# Patient Record
Sex: MALE | Race: BLACK OR AFRICAN AMERICAN | Employment: UNEMPLOYED | URBAN - METROPOLITAN AREA
[De-identification: names, ages, dates, MRNs, and addresses within clinical notes are randomized per-mention and may not be internally consistent; named-entity substitution may affect disease eponyms.]

---

## 2017-09-14 ENCOUNTER — HOSPITAL ENCOUNTER (EMERGENCY)
Age: 27
Discharge: HOME | End: 2017-09-14
Attending: EMERGENCY MEDICINE
Payer: MEDICAID

## 2017-09-14 VITALS
BODY MASS INDEX: 26.5 KG/M2 | HEART RATE: 54 BPM | OXYGEN SATURATION: 99 % | SYSTOLIC BLOOD PRESSURE: 132 MMHG | WEIGHT: 190 LBS | DIASTOLIC BLOOD PRESSURE: 84 MMHG | TEMPERATURE: 97.2 F | RESPIRATION RATE: 16 BRPM

## 2017-09-14 DIAGNOSIS — M79.18 BRACHIORADIALIS MUSCLE TENDERNESS: Primary | ICD-10-CM

## 2017-09-14 PROCEDURE — 6370000000 HC RX 637 (ALT 250 FOR IP): Performed by: EMERGENCY MEDICINE

## 2017-09-14 PROCEDURE — 99283 EMERGENCY DEPT VISIT LOW MDM: CPT

## 2017-09-14 RX ORDER — IBUPROFEN 800 MG/1
800 TABLET ORAL ONCE
Status: COMPLETED | OUTPATIENT
Start: 2017-09-14 | End: 2017-09-14

## 2017-09-14 RX ORDER — IBUPROFEN 800 MG/1
800 TABLET ORAL EVERY 8 HOURS PRN
Qty: 30 TABLET | Refills: 0 | Status: SHIPPED | OUTPATIENT
Start: 2017-09-14 | End: 2018-05-17

## 2017-09-14 RX ADMIN — IBUPROFEN 800 MG: 800 TABLET, FILM COATED ORAL at 08:06

## 2017-09-14 ASSESSMENT — PAIN SCALES - GENERAL: PAINLEVEL_OUTOF10: 7

## 2017-09-14 ASSESSMENT — PAIN DESCRIPTION - PAIN TYPE: TYPE: ACUTE PAIN

## 2017-09-14 ASSESSMENT — PAIN DESCRIPTION - LOCATION: LOCATION: ARM

## 2017-09-14 ASSESSMENT — PAIN DESCRIPTION - ORIENTATION: ORIENTATION: LEFT

## 2017-09-14 ASSESSMENT — PAIN DESCRIPTION - ONSET: ONSET: GRADUAL

## 2017-09-14 ASSESSMENT — PAIN DESCRIPTION - PROGRESSION: CLINICAL_PROGRESSION: GRADUALLY WORSENING

## 2017-09-14 ASSESSMENT — PAIN DESCRIPTION - FREQUENCY: FREQUENCY: CONTINUOUS

## 2017-09-15 ASSESSMENT — ENCOUNTER SYMPTOMS
BACK PAIN: 0
COLOR CHANGE: 0

## 2018-05-17 ENCOUNTER — HOSPITAL ENCOUNTER (EMERGENCY)
Age: 28
Discharge: HOME | End: 2018-05-17
Attending: EMERGENCY MEDICINE
Payer: MEDICAID

## 2018-05-17 VITALS
TEMPERATURE: 99 F | RESPIRATION RATE: 16 BRPM | SYSTOLIC BLOOD PRESSURE: 114 MMHG | DIASTOLIC BLOOD PRESSURE: 79 MMHG | HEART RATE: 71 BPM | OXYGEN SATURATION: 98 %

## 2018-05-17 DIAGNOSIS — K08.89 PAIN, DENTAL: Primary | ICD-10-CM

## 2018-05-17 PROCEDURE — 6370000000 HC RX 637 (ALT 250 FOR IP): Performed by: PHYSICIAN ASSISTANT

## 2018-05-17 PROCEDURE — 99282 EMERGENCY DEPT VISIT SF MDM: CPT

## 2018-05-17 RX ORDER — PENICILLIN V POTASSIUM 250 MG/1
500 TABLET ORAL ONCE
Status: COMPLETED | OUTPATIENT
Start: 2018-05-17 | End: 2018-05-17

## 2018-05-17 RX ORDER — PENICILLIN V POTASSIUM 500 MG/1
500 TABLET ORAL 4 TIMES DAILY
Qty: 40 TABLET | Refills: 0 | Status: ON HOLD | OUTPATIENT
Start: 2018-05-17 | End: 2021-02-14 | Stop reason: HOSPADM

## 2018-05-17 RX ORDER — IBUPROFEN 800 MG/1
800 TABLET ORAL EVERY 8 HOURS PRN
Qty: 30 TABLET | Refills: 0 | Status: ON HOLD | OUTPATIENT
Start: 2018-05-17 | End: 2021-02-14 | Stop reason: HOSPADM

## 2018-05-17 RX ORDER — ACETAMINOPHEN 325 MG/1
650 TABLET ORAL ONCE
Status: COMPLETED | OUTPATIENT
Start: 2018-05-17 | End: 2018-05-17

## 2018-05-17 RX ORDER — ACETAMINOPHEN 325 MG/1
650 TABLET ORAL EVERY 6 HOURS PRN
Qty: 40 TABLET | Refills: 0 | Status: ON HOLD | OUTPATIENT
Start: 2018-05-17 | End: 2021-02-14 | Stop reason: HOSPADM

## 2018-05-17 RX ADMIN — ACETAMINOPHEN 650 MG: 325 TABLET ORAL at 18:38

## 2018-05-17 RX ADMIN — PENICILLIN V POTASSIUM 500 MG: 250 TABLET ORAL at 18:38

## 2018-05-17 RX ADMIN — BENZOCAINE 1 EACH: 220 GEL, DENTIFRICE DENTAL at 18:38

## 2018-05-17 ASSESSMENT — PAIN SCALES - GENERAL
PAINLEVEL_OUTOF10: 8
PAINLEVEL_OUTOF10: 8

## 2018-05-17 ASSESSMENT — PAIN DESCRIPTION - ORIENTATION: ORIENTATION: LEFT;LOWER

## 2018-05-17 ASSESSMENT — PAIN DESCRIPTION - FREQUENCY: FREQUENCY: CONTINUOUS

## 2018-05-17 ASSESSMENT — ENCOUNTER SYMPTOMS
ALLERGIC/IMMUNOLOGIC NEGATIVE: 1
RESPIRATORY NEGATIVE: 1
GASTROINTESTINAL NEGATIVE: 1

## 2018-05-17 ASSESSMENT — PAIN DESCRIPTION - PAIN TYPE: TYPE: ACUTE PAIN

## 2018-05-17 ASSESSMENT — PAIN DESCRIPTION - LOCATION: LOCATION: TEETH

## 2018-05-17 ASSESSMENT — PAIN DESCRIPTION - DESCRIPTORS: DESCRIPTORS: THROBBING

## 2021-02-13 ENCOUNTER — APPOINTMENT (OUTPATIENT)
Dept: GENERAL RADIOLOGY | Age: 31
DRG: 159 | End: 2021-02-13

## 2021-02-13 ENCOUNTER — APPOINTMENT (OUTPATIENT)
Dept: CT IMAGING | Age: 31
DRG: 159 | End: 2021-02-13

## 2021-02-13 ENCOUNTER — ANESTHESIA (OUTPATIENT)
Dept: OPERATING ROOM | Age: 31
DRG: 159 | End: 2021-02-13

## 2021-02-13 ENCOUNTER — ANESTHESIA EVENT (OUTPATIENT)
Dept: OPERATING ROOM | Age: 31
DRG: 159 | End: 2021-02-13

## 2021-02-13 ENCOUNTER — HOSPITAL ENCOUNTER (INPATIENT)
Age: 31
LOS: 1 days | Discharge: HOME | DRG: 159 | End: 2021-02-14
Attending: EMERGENCY MEDICINE | Admitting: SURGERY

## 2021-02-13 VITALS — SYSTOLIC BLOOD PRESSURE: 130 MMHG | DIASTOLIC BLOOD PRESSURE: 67 MMHG | TEMPERATURE: 96.8 F | OXYGEN SATURATION: 100 %

## 2021-02-13 DIAGNOSIS — S02.609A BILATERAL CLOSED FRACTURE OF MANDIBLE, INITIAL ENCOUNTER (HCC): ICD-10-CM

## 2021-02-13 DIAGNOSIS — S02.640A CLOSED FRACTURE OF RAMUS OF MANDIBLE, UNSPECIFIED LATERALITY, INITIAL ENCOUNTER (HCC): Primary | ICD-10-CM

## 2021-02-13 PROBLEM — S01.511A LACERATION OF LIP, INITIAL ENCOUNTER: Status: ACTIVE | Noted: 2021-02-13

## 2021-02-13 PROBLEM — F10.929 ALCOHOL INTOXICATION (HCC): Status: ACTIVE | Noted: 2021-02-13
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
## 2021-02-13 LAB
ABSOLUTE EOS #: 0.13 K/UL (ref 0–0.44)
ABSOLUTE IMMATURE GRANULOCYTE: 0.04 K/UL (ref 0–0.3)
ABSOLUTE LYMPH #: 2.44 K/UL (ref 1.1–3.7)
ABSOLUTE MONO #: 0.75 K/UL (ref 0.1–1.2)
ANION GAP SERPL CALCULATED.3IONS-SCNC: 14 MMOL/L (ref 9–17)
BASOPHILS # BLD: 0 % (ref 0–2)
BASOPHILS ABSOLUTE: 0.06 K/UL (ref 0–0.2)
BUN BLDV-MCNC: 13 MG/DL (ref 6–20)
BUN/CREAT BLD: ABNORMAL (ref 9–20)
CALCIUM SERPL-MCNC: 9.2 MG/DL (ref 8.6–10.4)
CHLORIDE BLD-SCNC: 101 MMOL/L (ref 98–107)
CO2: 21 MMOL/L (ref 20–31)
CREAT SERPL-MCNC: 0.9 MG/DL (ref 0.7–1.2)
DIFFERENTIAL TYPE: ABNORMAL
EOSINOPHILS RELATIVE PERCENT: 1 % (ref 1–4)
GFR AFRICAN AMERICAN: >60 ML/MIN
GFR NON-AFRICAN AMERICAN: >60 ML/MIN
GFR SERPL CREATININE-BSD FRML MDRD: ABNORMAL ML/MIN/{1.73_M2}
GFR SERPL CREATININE-BSD FRML MDRD: ABNORMAL ML/MIN/{1.73_M2}
GLUCOSE BLD-MCNC: 93 MG/DL (ref 70–99)
HCT VFR BLD CALC: 47 % (ref 40.7–50.3)
HEMOGLOBIN: 15.6 G/DL (ref 13–17)
IMMATURE GRANULOCYTES: 0 %
LYMPHOCYTES # BLD: 16 % (ref 24–43)
MCH RBC QN AUTO: 30.1 PG (ref 25.2–33.5)
MCHC RBC AUTO-ENTMCNC: 33.2 G/DL (ref 28.4–34.8)
MCV RBC AUTO: 90.7 FL (ref 82.6–102.9)
MONOCYTES # BLD: 5 % (ref 3–12)
NRBC AUTOMATED: 0 PER 100 WBC
PDW BLD-RTO: 12.4 % (ref 11.8–14.4)
PLATELET # BLD: 228 K/UL (ref 138–453)
PLATELET ESTIMATE: ABNORMAL
PMV BLD AUTO: 9.9 FL (ref 8.1–13.5)
POTASSIUM SERPL-SCNC: 3.6 MMOL/L (ref 3.7–5.3)
RBC # BLD: 5.18 M/UL (ref 4.21–5.77)
RBC # BLD: ABNORMAL 10*6/UL
SARS-COV-2, RAPID: NOT DETECTED
SARS-COV-2: NORMAL
SARS-COV-2: NORMAL
SEG NEUTROPHILS: 78 % (ref 36–65)
SEGMENTED NEUTROPHILS ABSOLUTE COUNT: 11.87 K/UL (ref 1.5–8.1)
SODIUM BLD-SCNC: 136 MMOL/L (ref 135–144)
SOURCE: NORMAL
WBC # BLD: 15.3 K/UL (ref 3.5–11.3)
WBC # BLD: ABNORMAL 10*3/UL

## 2021-02-13 PROCEDURE — 70450 CT HEAD/BRAIN W/O DYE: CPT

## 2021-02-13 PROCEDURE — 6360000002 HC RX W HCPCS: Performed by: NURSE ANESTHETIST, CERTIFIED REGISTERED

## 2021-02-13 PROCEDURE — 6370000000 HC RX 637 (ALT 250 FOR IP): Performed by: PLASTIC SURGERY

## 2021-02-13 PROCEDURE — U0002 COVID-19 LAB TEST NON-CDC: HCPCS

## 2021-02-13 PROCEDURE — 6360000002 HC RX W HCPCS: Performed by: STUDENT IN AN ORGANIZED HEALTH CARE EDUCATION/TRAINING PROGRAM

## 2021-02-13 PROCEDURE — 70486 CT MAXILLOFACIAL W/O DYE: CPT

## 2021-02-13 PROCEDURE — 3600000002 HC SURGERY LEVEL 2 BASE: Performed by: PLASTIC SURGERY

## 2021-02-13 PROCEDURE — 80048 BASIC METABOLIC PNL TOTAL CA: CPT

## 2021-02-13 PROCEDURE — 94760 N-INVAS EAR/PLS OXIMETRY 1: CPT

## 2021-02-13 PROCEDURE — 6370000000 HC RX 637 (ALT 250 FOR IP): Performed by: STUDENT IN AN ORGANIZED HEALTH CARE EDUCATION/TRAINING PROGRAM

## 2021-02-13 PROCEDURE — 0CQ1XZZ REPAIR LOWER LIP, EXTERNAL APPROACH: ICD-10-PCS | Performed by: EMERGENCY MEDICINE

## 2021-02-13 PROCEDURE — 0CQ0XZZ REPAIR UPPER LIP, EXTERNAL APPROACH: ICD-10-PCS | Performed by: PLASTIC SURGERY

## 2021-02-13 PROCEDURE — 90715 TDAP VACCINE 7 YRS/> IM: CPT | Performed by: STUDENT IN AN ORGANIZED HEALTH CARE EDUCATION/TRAINING PROGRAM

## 2021-02-13 PROCEDURE — 12011 RPR F/E/E/N/L/M 2.5 CM/<: CPT

## 2021-02-13 PROCEDURE — 99285 EMERGENCY DEPT VISIT HI MDM: CPT

## 2021-02-13 PROCEDURE — 71045 X-RAY EXAM CHEST 1 VIEW: CPT

## 2021-02-13 PROCEDURE — 90471 IMMUNIZATION ADMIN: CPT | Performed by: STUDENT IN AN ORGANIZED HEALTH CARE EDUCATION/TRAINING PROGRAM

## 2021-02-13 PROCEDURE — 96372 THER/PROPH/DIAG INJ SC/IM: CPT

## 2021-02-13 PROCEDURE — 3600000012 HC SURGERY LEVEL 2 ADDTL 15MIN: Performed by: PLASTIC SURGERY

## 2021-02-13 PROCEDURE — 2W31X9Z IMMOBILIZATION OF FACE USING WIRE: ICD-10-PCS | Performed by: PLASTIC SURGERY

## 2021-02-13 PROCEDURE — 6360000002 HC RX W HCPCS

## 2021-02-13 PROCEDURE — 2500000003 HC RX 250 WO HCPCS: Performed by: NURSE ANESTHETIST, CERTIFIED REGISTERED

## 2021-02-13 PROCEDURE — 2580000003 HC RX 258: Performed by: STUDENT IN AN ORGANIZED HEALTH CARE EDUCATION/TRAINING PROGRAM

## 2021-02-13 PROCEDURE — 72125 CT NECK SPINE W/O DYE: CPT

## 2021-02-13 PROCEDURE — 1200000000 HC SEMI PRIVATE

## 2021-02-13 PROCEDURE — 3700000001 HC ADD 15 MINUTES (ANESTHESIA): Performed by: PLASTIC SURGERY

## 2021-02-13 PROCEDURE — 2709999900 HC NON-CHARGEABLE SUPPLY: Performed by: PLASTIC SURGERY

## 2021-02-13 PROCEDURE — 2580000003 HC RX 258: Performed by: PLASTIC SURGERY

## 2021-02-13 PROCEDURE — 7100000000 HC PACU RECOVERY - FIRST 15 MIN: Performed by: PLASTIC SURGERY

## 2021-02-13 PROCEDURE — 7100000001 HC PACU RECOVERY - ADDTL 15 MIN: Performed by: PLASTIC SURGERY

## 2021-02-13 PROCEDURE — 85025 COMPLETE CBC W/AUTO DIFF WBC: CPT

## 2021-02-13 PROCEDURE — 3700000000 HC ANESTHESIA ATTENDED CARE: Performed by: PLASTIC SURGERY

## 2021-02-13 PROCEDURE — 70498 CT ANGIOGRAPHY NECK: CPT

## 2021-02-13 PROCEDURE — 6360000004 HC RX CONTRAST MEDICATION: Performed by: SURGERY

## 2021-02-13 PROCEDURE — 6360000002 HC RX W HCPCS: Performed by: PLASTIC SURGERY

## 2021-02-13 PROCEDURE — 76376 3D RENDER W/INTRP POSTPROCES: CPT

## 2021-02-13 DEVICE — IMPLANTABLE DEVICE
Type: IMPLANTABLE DEVICE | Site: MANDIBLE | Status: NON-FUNCTIONAL
Removed: 2021-04-07

## 2021-02-13 RX ORDER — AMOXICILLIN AND CLAVULANATE POTASSIUM 600; 42.9 MG/5ML; MG/5ML
600 POWDER, FOR SUSPENSION ORAL EVERY 12 HOURS SCHEDULED
Status: DISCONTINUED | OUTPATIENT
Start: 2021-02-13 | End: 2021-02-14 | Stop reason: HOSPADM

## 2021-02-13 RX ORDER — PROPOFOL 10 MG/ML
INJECTION, EMULSION INTRAVENOUS PRN
Status: DISCONTINUED | OUTPATIENT
Start: 2021-02-13 | End: 2021-02-13 | Stop reason: SDUPTHER

## 2021-02-13 RX ORDER — SODIUM CHLORIDE, SODIUM LACTATE, POTASSIUM CHLORIDE, CALCIUM CHLORIDE 600; 310; 30; 20 MG/100ML; MG/100ML; MG/100ML; MG/100ML
INJECTION, SOLUTION INTRAVENOUS CONTINUOUS
Status: DISCONTINUED | OUTPATIENT
Start: 2021-02-13 | End: 2021-02-14

## 2021-02-13 RX ORDER — SODIUM CHLORIDE 0.9 % (FLUSH) 0.9 %
10 SYRINGE (ML) INJECTION EVERY 12 HOURS SCHEDULED
Status: DISCONTINUED | OUTPATIENT
Start: 2021-02-13 | End: 2021-02-14 | Stop reason: HOSPADM

## 2021-02-13 RX ORDER — CHLORHEXIDINE GLUCONATE 0.12 MG/ML
RINSE ORAL PRN
Status: DISCONTINUED | OUTPATIENT
Start: 2021-02-13 | End: 2021-02-13 | Stop reason: HOSPADM

## 2021-02-13 RX ORDER — KETOROLAC TROMETHAMINE 30 MG/ML
INJECTION, SOLUTION INTRAMUSCULAR; INTRAVENOUS PRN
Status: DISCONTINUED | OUTPATIENT
Start: 2021-02-13 | End: 2021-02-13 | Stop reason: SDUPTHER

## 2021-02-13 RX ORDER — FENTANYL CITRATE 50 UG/ML
INJECTION, SOLUTION INTRAMUSCULAR; INTRAVENOUS PRN
Status: DISCONTINUED | OUTPATIENT
Start: 2021-02-13 | End: 2021-02-13 | Stop reason: SDUPTHER

## 2021-02-13 RX ORDER — ONDANSETRON 2 MG/ML
INJECTION INTRAMUSCULAR; INTRAVENOUS
Status: COMPLETED
Start: 2021-02-13 | End: 2021-02-13

## 2021-02-13 RX ORDER — POTASSIUM CHLORIDE 7.45 MG/ML
10 INJECTION INTRAVENOUS
Status: COMPLETED | OUTPATIENT
Start: 2021-02-13 | End: 2021-02-13

## 2021-02-13 RX ORDER — FENTANYL CITRATE 50 UG/ML
50 INJECTION, SOLUTION INTRAMUSCULAR; INTRAVENOUS ONCE
Status: COMPLETED | OUTPATIENT
Start: 2021-02-13 | End: 2021-02-13

## 2021-02-13 RX ORDER — MIDAZOLAM HYDROCHLORIDE 1 MG/ML
INJECTION INTRAMUSCULAR; INTRAVENOUS PRN
Status: DISCONTINUED | OUTPATIENT
Start: 2021-02-13 | End: 2021-02-13 | Stop reason: SDUPTHER

## 2021-02-13 RX ORDER — ONDANSETRON 2 MG/ML
4 INJECTION INTRAMUSCULAR; INTRAVENOUS EVERY 6 HOURS PRN
Status: DISCONTINUED | OUTPATIENT
Start: 2021-02-13 | End: 2021-02-14 | Stop reason: HOSPADM

## 2021-02-13 RX ORDER — DEXAMETHASONE SODIUM PHOSPHATE 4 MG/ML
INJECTION, SOLUTION INTRA-ARTICULAR; INTRALESIONAL; INTRAMUSCULAR; INTRAVENOUS; SOFT TISSUE PRN
Status: DISCONTINUED | OUTPATIENT
Start: 2021-02-13 | End: 2021-02-13 | Stop reason: SDUPTHER

## 2021-02-13 RX ORDER — SUCCINYLCHOLINE/SOD CL,ISO/PF 100 MG/5ML
SYRINGE (ML) INTRAVENOUS PRN
Status: DISCONTINUED | OUTPATIENT
Start: 2021-02-13 | End: 2021-02-13 | Stop reason: SDUPTHER

## 2021-02-13 RX ORDER — SODIUM CHLORIDE 0.9 % (FLUSH) 0.9 %
10 SYRINGE (ML) INJECTION PRN
Status: DISCONTINUED | OUTPATIENT
Start: 2021-02-13 | End: 2021-02-14 | Stop reason: HOSPADM

## 2021-02-13 RX ORDER — ROCURONIUM BROMIDE 10 MG/ML
INJECTION, SOLUTION INTRAVENOUS PRN
Status: DISCONTINUED | OUTPATIENT
Start: 2021-02-13 | End: 2021-02-13 | Stop reason: SDUPTHER

## 2021-02-13 RX ORDER — FENTANYL CITRATE 50 UG/ML
50 INJECTION, SOLUTION INTRAMUSCULAR; INTRAVENOUS EVERY 5 MIN PRN
Status: DISCONTINUED | OUTPATIENT
Start: 2021-02-13 | End: 2021-02-13 | Stop reason: HOSPADM

## 2021-02-13 RX ORDER — FENTANYL CITRATE 50 UG/ML
25 INJECTION, SOLUTION INTRAMUSCULAR; INTRAVENOUS EVERY 5 MIN PRN
Status: DISCONTINUED | OUTPATIENT
Start: 2021-02-13 | End: 2021-02-13 | Stop reason: HOSPADM

## 2021-02-13 RX ORDER — GLYCOPYRROLATE 1 MG/5 ML
SYRINGE (ML) INTRAVENOUS PRN
Status: DISCONTINUED | OUTPATIENT
Start: 2021-02-13 | End: 2021-02-13 | Stop reason: SDUPTHER

## 2021-02-13 RX ORDER — CYCLOBENZAPRINE HCL 10 MG
10 TABLET ORAL EVERY 8 HOURS
Status: DISCONTINUED | OUTPATIENT
Start: 2021-02-13 | End: 2021-02-14 | Stop reason: HOSPADM

## 2021-02-13 RX ORDER — MAGNESIUM HYDROXIDE 1200 MG/15ML
LIQUID ORAL CONTINUOUS PRN
Status: COMPLETED | OUTPATIENT
Start: 2021-02-13 | End: 2021-02-13

## 2021-02-13 RX ORDER — ACETAMINOPHEN 160 MG/5ML
1000 SOLUTION ORAL EVERY 8 HOURS
Status: DISCONTINUED | OUTPATIENT
Start: 2021-02-13 | End: 2021-02-14 | Stop reason: HOSPADM

## 2021-02-13 RX ORDER — ONDANSETRON 2 MG/ML
INJECTION INTRAMUSCULAR; INTRAVENOUS PRN
Status: DISCONTINUED | OUTPATIENT
Start: 2021-02-13 | End: 2021-02-13 | Stop reason: SDUPTHER

## 2021-02-13 RX ORDER — OXYCODONE HCL 5 MG/5 ML
5 SOLUTION, ORAL ORAL EVERY 6 HOURS PRN
Status: DISCONTINUED | OUTPATIENT
Start: 2021-02-13 | End: 2021-02-14 | Stop reason: HOSPADM

## 2021-02-13 RX ORDER — FENTANYL CITRATE 50 UG/ML
25 INJECTION, SOLUTION INTRAMUSCULAR; INTRAVENOUS
Status: DISCONTINUED | OUTPATIENT
Start: 2021-02-13 | End: 2021-02-13

## 2021-02-13 RX ORDER — LIDOCAINE HYDROCHLORIDE 10 MG/ML
INJECTION, SOLUTION EPIDURAL; INFILTRATION; INTRACAUDAL; PERINEURAL PRN
Status: DISCONTINUED | OUTPATIENT
Start: 2021-02-13 | End: 2021-02-13 | Stop reason: SDUPTHER

## 2021-02-13 RX ORDER — ONDANSETRON 4 MG/1
4 TABLET, ORALLY DISINTEGRATING ORAL EVERY 8 HOURS PRN
Status: DISCONTINUED | OUTPATIENT
Start: 2021-02-13 | End: 2021-02-13

## 2021-02-13 RX ADMIN — PHENYLEPHRINE HYDROCHLORIDE 200 MCG: 10 INJECTION INTRAVENOUS at 14:22

## 2021-02-13 RX ADMIN — OXYCODONE HYDROCHLORIDE 5 MG: 5 SOLUTION ORAL at 20:00

## 2021-02-13 RX ADMIN — CYCLOBENZAPRINE 10 MG: 10 TABLET, FILM COATED ORAL at 09:53

## 2021-02-13 RX ADMIN — SUGAMMADEX 200 MG: 100 INJECTION, SOLUTION INTRAVENOUS at 14:42

## 2021-02-13 RX ADMIN — PROPOFOL 200 MG: 10 INJECTION, EMULSION INTRAVENOUS at 13:54

## 2021-02-13 RX ADMIN — AMOXICILLIN AND CLAVULANATE POTASSIUM 600 MG: 600; 42.9 SUSPENSION ORAL at 21:19

## 2021-02-13 RX ADMIN — POTASSIUM CHLORIDE 10 MEQ: 10 INJECTION, SOLUTION INTRAVENOUS at 10:59

## 2021-02-13 RX ADMIN — Medication 0.2 MG: at 13:47

## 2021-02-13 RX ADMIN — SODIUM CHLORIDE 1500 MG: 900 INJECTION INTRAVENOUS at 05:21

## 2021-02-13 RX ADMIN — SODIUM CHLORIDE, PRESERVATIVE FREE 10 ML: 5 INJECTION INTRAVENOUS at 09:52

## 2021-02-13 RX ADMIN — IBUPROFEN 400 MG: 200 SUSPENSION ORAL at 21:23

## 2021-02-13 RX ADMIN — ONDANSETRON 4 MG: 2 INJECTION INTRAMUSCULAR; INTRAVENOUS at 08:00

## 2021-02-13 RX ADMIN — ONDANSETRON 4 MG: 2 INJECTION INTRAMUSCULAR; INTRAVENOUS at 21:15

## 2021-02-13 RX ADMIN — ROCURONIUM BROMIDE 40 MG: 10 INJECTION INTRAVENOUS at 14:01

## 2021-02-13 RX ADMIN — KETOROLAC TROMETHAMINE 30 MG: 30 INJECTION, SOLUTION INTRAMUSCULAR at 14:55

## 2021-02-13 RX ADMIN — DEXAMETHASONE SODIUM PHOSPHATE 4 MG: 4 INJECTION, SOLUTION INTRAMUSCULAR; INTRAVENOUS at 14:00

## 2021-02-13 RX ADMIN — POTASSIUM CHLORIDE 10 MEQ: 10 INJECTION, SOLUTION INTRAVENOUS at 12:09

## 2021-02-13 RX ADMIN — ACETAMINOPHEN 1000 MG: 650 SOLUTION ORAL at 09:52

## 2021-02-13 RX ADMIN — FENTANYL CITRATE 50 MCG: 50 INJECTION, SOLUTION INTRAMUSCULAR; INTRAVENOUS at 04:26

## 2021-02-13 RX ADMIN — IOPAMIDOL 90 ML: 755 INJECTION, SOLUTION INTRAVENOUS at 04:49

## 2021-02-13 RX ADMIN — TETANUS TOXOID, REDUCED DIPHTHERIA TOXOID AND ACELLULAR PERTUSSIS VACCINE, ADSORBED 0.5 ML: 5; 2.5; 8; 8; 2.5 SUSPENSION INTRAMUSCULAR at 04:26

## 2021-02-13 RX ADMIN — FENTANYL CITRATE 25 MCG: 50 INJECTION, SOLUTION INTRAMUSCULAR; INTRAVENOUS at 05:21

## 2021-02-13 RX ADMIN — POTASSIUM CHLORIDE 10 MEQ: 10 INJECTION, SOLUTION INTRAVENOUS at 09:51

## 2021-02-13 RX ADMIN — SODIUM CHLORIDE, POTASSIUM CHLORIDE, SODIUM LACTATE AND CALCIUM CHLORIDE: 600; 310; 30; 20 INJECTION, SOLUTION INTRAVENOUS at 09:51

## 2021-02-13 RX ADMIN — IBUPROFEN 400 MG: 200 SUSPENSION ORAL at 09:53

## 2021-02-13 RX ADMIN — ACETAMINOPHEN 1000 MG: 650 SOLUTION ORAL at 21:21

## 2021-02-13 RX ADMIN — LIDOCAINE HYDROCHLORIDE 50 MG: 10 INJECTION, SOLUTION EPIDURAL; INFILTRATION; INTRACAUDAL; PERINEURAL at 13:54

## 2021-02-13 RX ADMIN — MIDAZOLAM HYDROCHLORIDE 2 MG: 1 INJECTION, SOLUTION INTRAMUSCULAR; INTRAVENOUS at 13:50

## 2021-02-13 RX ADMIN — PHENYLEPHRINE HYDROCHLORIDE 200 MCG: 10 INJECTION INTRAVENOUS at 14:12

## 2021-02-13 RX ADMIN — SODIUM CHLORIDE, POTASSIUM CHLORIDE, SODIUM LACTATE AND CALCIUM CHLORIDE: 600; 310; 30; 20 INJECTION, SOLUTION INTRAVENOUS at 14:21

## 2021-02-13 RX ADMIN — FENTANYL CITRATE 100 MCG: 50 INJECTION, SOLUTION INTRAMUSCULAR; INTRAVENOUS at 13:54

## 2021-02-13 RX ADMIN — FENTANYL CITRATE 25 MCG: 50 INJECTION, SOLUTION INTRAMUSCULAR; INTRAVENOUS at 07:42

## 2021-02-13 RX ADMIN — ONDANSETRON 4 MG: 2 INJECTION INTRAMUSCULAR; INTRAVENOUS at 14:43

## 2021-02-13 RX ADMIN — SODIUM CHLORIDE 1500 MG: 900 INJECTION INTRAVENOUS at 12:48

## 2021-02-13 RX ADMIN — FENTANYL CITRATE 25 MCG: 50 INJECTION, SOLUTION INTRAMUSCULAR; INTRAVENOUS at 06:45

## 2021-02-13 RX ADMIN — Medication 100 MG: at 13:54

## 2021-02-13 ASSESSMENT — PULMONARY FUNCTION TESTS
PIF_VALUE: 17
PIF_VALUE: 1
PIF_VALUE: 17
PIF_VALUE: 16
PIF_VALUE: 14
PIF_VALUE: 1
PIF_VALUE: 20
PIF_VALUE: 20
PIF_VALUE: 17
PIF_VALUE: 16
PIF_VALUE: 20
PIF_VALUE: 17
PIF_VALUE: 21
PIF_VALUE: 15
PIF_VALUE: 5
PIF_VALUE: 17
PIF_VALUE: 0
PIF_VALUE: 19
PIF_VALUE: 20
PIF_VALUE: 20
PIF_VALUE: 21
PIF_VALUE: 20
PIF_VALUE: 19
PIF_VALUE: 17
PIF_VALUE: 20
PIF_VALUE: 21

## 2021-02-13 ASSESSMENT — PAIN SCALES - GENERAL
PAINLEVEL_OUTOF10: 7
PAINLEVEL_OUTOF10: 8
PAINLEVEL_OUTOF10: 10
PAINLEVEL_OUTOF10: 10

## 2021-02-13 ASSESSMENT — ENCOUNTER SYMPTOMS
SINUS PRESSURE: 0
FACIAL SWELLING: 1
NAUSEA: 0
APNEA: 0
VOMITING: 0
SHORTNESS OF BREATH: 0
SINUS PAIN: 0
BACK PAIN: 0
ABDOMINAL PAIN: 0
ABDOMINAL DISTENTION: 0

## 2021-02-13 ASSESSMENT — PAIN DESCRIPTION - LOCATION: LOCATION: FACE

## 2021-02-13 ASSESSMENT — PAIN DESCRIPTION - FREQUENCY: FREQUENCY: CONTINUOUS

## 2021-02-13 ASSESSMENT — PAIN SCALES - WONG BAKER
WONGBAKER_NUMERICALRESPONSE: 0
WONGBAKER_NUMERICALRESPONSE: 0

## 2021-02-13 ASSESSMENT — PAIN DESCRIPTION - ONSET: ONSET: SUDDEN

## 2021-02-13 ASSESSMENT — LIFESTYLE VARIABLES: SMOKING_STATUS: 1
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
## 2021-02-13 NOTE — ANESTHESIA POSTPROCEDURE EVALUATION
Department of Anesthesiology  Postprocedure Note    Patient: Agueda Parker  MRN: [id number]  YOB: 1990  Date of evaluation: 2/13/2021  Time:  4:20 PM     Procedure Summary     Date: 02/13/21 Room / Location: [address]    Anesthesia Start: 3535 Anesthesia Stop: 6726    Procedure: ARCH BAR FIXATION TO MANDIBLE FRACTURE (N/A ) Diagnosis: (Mandible Fracture)    Surgeons: Mildred Aldridge MD Responsible Provider: Agnes Johnson MD    Anesthesia Type: general ASA Status: 2          Anesthesia Type: general    Ra Phase I: Ra Score: 8    Ra Phase II:      Last vitals: Reviewed and per EMR flowsheets.        Anesthesia Post Evaluation    Patient location during evaluation: PACU  Patient participation: complete - patient participated  Level of consciousness: awake and alert  Pain score: 2  Airway patency: patent  Nausea & Vomiting: no nausea and no vomiting  Complications: no  Cardiovascular status: hemodynamically stable  Respiratory status: acceptable  Hydration status: euvolemic
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
## 2021-02-13 NOTE — ED NOTES
Dr. Farhat De Leon at bedside to cleanse facial lac for possible suture.         Pravin Ding RN  02/13/21 6595

## 2021-02-13 NOTE — PROGRESS NOTES
RAPID Covid 19 swab taken from right nare, labeled, placed in red dot bag, and handed off to second healthcare worker outside of room for transport to laboratory per hospital policy and procedure. Patient tolerated procedure poorly.

## 2021-02-13 NOTE — FLOWSHEET NOTE
[facility] CARE DEPARTMENT - [address]     Emergency/Trauma Note    PATIENT NAME: Tracy Antony    Shift date: 2/12/2021  Shift day: Friday   Shift # 3    Room # ED9  Name: Tracy Antony            Age: 31 y.o. Gender: male          Roman Catholic: Other   Place of Anglican: Unknown    Trauma/Incident type: Adult Trauma Consult  Admit Date & Time: 2/13/2021  1:09 AM  TRAUMA NAME: N/A    ADVANCE DIRECTIVES IN CHART? No    NAME OF DECISION MAKER: Per next of kin hierarchy, Janak Castaneda ([phone]) is patient's decision-maker, unless otherwise stated. RELATIONSHIP OF DECISION MAKER TO PATIENT: Patient's Mother    PATIENT/EVENT DESCRIPTION:  Tracy Antony is a 31 y.o. male who arrived to ED9 and was paged out as an \"Adult Trauma Consult,\" due to an [address]. \" Patient had \"Facial Lacerations and was bleeding throughout. \" Pt to be admitted to [facility]3/0163-01. SPIRITUAL ASSESSMENT/INTERVENTION:   responded to page and introduced herself to patient. Patient expressed feelings of overwhelm and was tearful during visit.  offered to contact his support and patient confirmed that he would like his mother called.  attempted call to patient's mother and left voicemail, sharing the ED main line phone number for her reference. PATIENT BELONGINGS:  No belongings noted    ANY BELONGINGS OF SIGNIFICANT VALUE NOTED:  N/A    REGISTRATION STAFF NOTIFIED? Yes      WHAT IS YOUR SPIRITUAL CARE PLAN FOR THIS PATIENT?:  [facility] can make follow-up visit, per request. Lana Franklin can be reached 24/7 via [employer].     Electronically signed by Henna Skelton, on 2/13/2021 at 8:36 AM.  [facility]  [phone]
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
## 2021-02-13 NOTE — PROGRESS NOTES
[address] [facility]  Speech Language Pathology    Date: 2/13/2021  Patient Name: Nestor Garcia  YOB: 1990   AGE: 31 y.o. MRN: [id number]        Patient Not Available for Speech Therapy     Due to:  [] Testing  [] Hemodialysis  [] Cancelled by RN  [x] Surgery   [] Intubation/Sedation/Pain Medication  [] Medical instability  [] Other:    Next scheduled treatment: 2/14    Completed by:  Jody Rivera M.S. [id number] [facility]

## 2021-02-13 NOTE — ED NOTES
Pt back to room from CT. Pt provided pillow and warm blankets. RRT to obtain COVID swab. Will continue to monitor. Family remains at bedside.         Lisa Mcdonnell RN  02/13/21 7287
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
## 2021-02-13 NOTE — PLAN OF CARE
Problem: Skin Integrity:  Goal: Will show no infection signs and symptoms  Description: Will show no infection signs and symptoms  Outcome: Ongoing  Goal: Absence of new skin breakdown  Description: Absence of new skin breakdown  Outcome: Ongoing     Problem: Pain:  Goal: Pain level will decrease  Description: Pain level will decrease  Outcome: Ongoing  Goal: Control of acute pain  Description: Control of acute pain  Outcome: Ongoing  Goal: Control of chronic pain  Description: Control of chronic pain  Outcome: Ongoing

## 2021-02-13 NOTE — ED NOTES
Pt placed on continuous pulse oximetry per Dr. Leah Dougherty verbal order.         Darrion Aguillon RN  02/13/21 0145

## 2021-02-14 VITALS
HEART RATE: 65 BPM | RESPIRATION RATE: 16 BRPM | OXYGEN SATURATION: 100 % | BODY MASS INDEX: 23.1 KG/M2 | TEMPERATURE: 97.9 F | SYSTOLIC BLOOD PRESSURE: 127 MMHG | WEIGHT: 165 LBS | DIASTOLIC BLOOD PRESSURE: 82 MMHG | HEIGHT: 71 IN

## 2021-02-14 PROCEDURE — 6370000000 HC RX 637 (ALT 250 FOR IP): Performed by: PLASTIC SURGERY

## 2021-02-14 PROCEDURE — 92523 SPEECH SOUND LANG COMPREHEN: CPT

## 2021-02-14 PROCEDURE — 6370000000 HC RX 637 (ALT 250 FOR IP): Performed by: STUDENT IN AN ORGANIZED HEALTH CARE EDUCATION/TRAINING PROGRAM

## 2021-02-14 RX ORDER — OXYCODONE HCL 5 MG/5 ML
5 SOLUTION, ORAL ORAL EVERY 8 HOURS PRN
Qty: 100 ML | Refills: 0 | Status: SHIPPED | OUTPATIENT
Start: 2021-02-14 | End: 2021-02-21

## 2021-02-14 RX ORDER — CYCLOBENZAPRINE HCL 10 MG
10 TABLET ORAL EVERY 8 HOURS
Qty: 15 TABLET | Refills: 0 | Status: SHIPPED | OUTPATIENT
Start: 2021-02-14 | End: 2021-02-19

## 2021-02-14 RX ORDER — AMOXICILLIN AND CLAVULANATE POTASSIUM 600; 42.9 MG/5ML; MG/5ML
600 POWDER, FOR SUSPENSION ORAL EVERY 12 HOURS SCHEDULED
Qty: 60 ML | Refills: 0 | Status: SHIPPED | OUTPATIENT
Start: 2021-02-14 | End: 2021-02-20

## 2021-02-14 RX ORDER — ACETAMINOPHEN 160 MG/5ML
1000 SOLUTION ORAL EVERY 8 HOURS
Qty: 655.83 ML | Refills: 0 | Status: SHIPPED | OUTPATIENT
Start: 2021-02-14 | End: 2022-08-10

## 2021-02-14 RX ADMIN — ACETAMINOPHEN 1000 MG: 650 SOLUTION ORAL at 11:18

## 2021-02-14 RX ADMIN — IBUPROFEN 400 MG: 200 SUSPENSION ORAL at 04:44

## 2021-02-14 RX ADMIN — OXYCODONE HYDROCHLORIDE 5 MG: 5 SOLUTION ORAL at 04:46

## 2021-02-14 RX ADMIN — ACETAMINOPHEN 1000 MG: 650 SOLUTION ORAL at 04:44

## 2021-02-14 RX ADMIN — CYCLOBENZAPRINE 10 MG: 10 TABLET, FILM COATED ORAL at 08:27

## 2021-02-14 RX ADMIN — AMOXICILLIN AND CLAVULANATE POTASSIUM 600 MG: 600; 42.9 SUSPENSION ORAL at 08:30

## 2021-02-14 RX ADMIN — IBUPROFEN 400 MG: 200 SUSPENSION ORAL at 12:33

## 2021-02-14 RX ADMIN — IBUPROFEN 400 MG: 200 SUSPENSION ORAL at 08:30

## 2021-02-14 ASSESSMENT — PAIN SCALES - GENERAL
PAINLEVEL_OUTOF10: 7
PAINLEVEL_OUTOF10: 7
PAINLEVEL_OUTOF10: 2

## 2021-02-14 ASSESSMENT — PAIN DESCRIPTION - LOCATION: LOCATION: JAW
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
## 2021-02-14 NOTE — PROGRESS NOTES
Occupational Therapy Not Seen Note    DATE: 2021  Name: Haritha Mayfield  : 1990  MRN: [id number]    Patient not available for Occupational Therapy due to:  Spoke with RN on patient needs, Pt independent with ADLs and functional tasks at this time.  Pt with no OT acute care needs, will defer OT eval.    Next Scheduled Treatment:     Electronically signed by Erasto Al OT on 2021 at 10:54 AM
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
## 2021-02-14 NOTE — PROGRESS NOTES
CLINICAL PHARMACY NOTE: MEDS TO [address] [employer] Select Patient?: No  Total # of Prescriptions Filled: 1   The following medications were delivered to the patient:  · augmentin suspension   Total # of Interventions Completed: 0  Time Spent (min): 30    Additional Documentation:    Oxycodone and flexeril ready at the pharmacy - patient did not have money so will be picking up later this week. Tylenol and ibuprofen are over the counter, gave patient a slip on directions if he would like to purchase at a later time.
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
## 2021-02-14 NOTE — DISCHARGE INSTR - DIET
Good nutrition is important when healing from an illness, injury, or surgery. Follow any nutrition recommendations given to you during your hospital stay. If you were given an oral nutrition supplement while in the hospital, continue to take this supplement at home. You can take it with meals, in-between meals, and/or before bedtime. These supplements can be purchased at most local grocery stores, pharmacies, and chain super-stores. If you have any questions about your diet or nutrition, call the hospital and ask for the dietitian. Learning About Pureeing Foods  What are pureed foods? Puree (say \"pyuh-RAY\") is a way to change the texture of solid food so that it is smooth with no lumps and has a texture like pudding. You can puree food in a  or . Pureed foods are important if you have trouble chewing or swallowing. Changing solid foods so they don't need to be chewed can make them safer and easier for you to swallow. Your doctor may have you talk with a speech-language pathologist. This person can help you learn how to puree food so that it is the right thickness and texture. How do you puree foods? Any food you can blend into a smooth, pudding-like texture with no lumps will work for this way of eating. Chop up larger pieces of food into smaller pieces, and place them in a  or . You may need to add liquid such as juice or broth to get the right thickness. Adding food or liquid slowly into the  or  will help you get to the right texture. If the puree is too thin, add more food. If it is too thick, add more liquid. You can use broth, gravy, juice, milk, or water to thin your food. Your doctor will help you understand what the right consistency is for your needs. Foods that can be pureed include:  Cooked pasta, potatoes, and rice. Cooked hot cereals, like oatmeal, grits, or Cream of Wheat. Cooked meats, fish and chicken.   Dairy products such as cottage cheese, yogurt, and ice cream.  Cooked vegetables such as potatoes and beans. Canned fruits. Ripe bananas and avocados. What type of foods are not recommended? Not all foods will puree well. In general, stay away from foods that are sticky, hard, or have seeds. Foods you should avoid include:  Nuts. Seeds. Raw vegetables. Bread that is dry or has a hard crust.  Dry cereals. Foods with tough skins or outer shells like peas, grapes, and chicken skin. Where can you learn more? Go to https://[employer] Associatespepiceweb.[employer]. org and sign in to your [employer] account. Enter P280 in the iSpecimen box to learn more about \"Learning About Pureeing Foods. \"     If you do not have an account, please click on the \"Sign Up Now\" link. Current as of: August 22, 2019               Content Version: 12.6  © 1808-6902 [employer]. Care instructions adapted under license by [facility] ([facility]). If you have questions about a medical condition or this instruction, always ask your healthcare professional. [place] [address] any warranty or liability for your use of this information.
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
## 2021-02-14 NOTE — PROGRESS NOTES
Pt given discharge education and paperwork. Pt demonstrates understanding. Pt wheeled to main entrance safely with all belongings.

## 2021-02-14 NOTE — PROGRESS NOTES
Physical Therapy    DATE: 2021    NAME: Tracy Antony  MRN: [id number]   : 1990      Patient not seen this date for Physical Therapy due to:    Patient independent with functional mobility with no acute PT needs. Will defer PT evaluation at this time. Discussed with pt and RN, both agree that he's getting around in the room independently. Per pt, he's likely to be [address] home today--pt denies PT needs.         Electronically signed by Fatuma Aguirre PT on 2021 at 9:07 AM

## 2021-02-14 NOTE — PLAN OF CARE
Problem: Skin Integrity:  Goal: Will show no infection signs and symptoms  Description: Will show no infection signs and symptoms  Outcome: Completed  Goal: Absence of new skin breakdown  Description: Absence of new skin breakdown  Outcome: Completed     Problem: Pain:  Goal: Pain level will decrease  Description: Pain level will decrease  Outcome: Completed  Goal: Control of acute pain  Description: Control of acute pain  Outcome: Completed  Goal: Control of chronic pain  Description: Control of chronic pain  Outcome: Completed

## 2021-02-14 NOTE — PROGRESS NOTES
Nutrition Education    · Verbally reviewed information with Patient  · Educated on fractured jaw diet/pureed foods  · Written educational materials provided.       Electronically signed by Tana Marinelli RD, KATI on 2/14/21 at 10:57 AM EST    Contact: [phone]
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
## 2021-02-21 ENCOUNTER — HOSPITAL ENCOUNTER (EMERGENCY)
Age: 31
Discharge: HOME | End: 2021-02-21
Attending: EMERGENCY MEDICINE

## 2021-02-21 VITALS
RESPIRATION RATE: 16 BRPM | TEMPERATURE: 98.4 F | HEART RATE: 78 BPM | SYSTOLIC BLOOD PRESSURE: 130 MMHG | DIASTOLIC BLOOD PRESSURE: 84 MMHG | OXYGEN SATURATION: 100 %

## 2021-02-21 DIAGNOSIS — K05.00 ACUTE GINGIVITIS: Primary | ICD-10-CM

## 2021-02-21 PROCEDURE — 99282 EMERGENCY DEPT VISIT SF MDM: CPT

## 2021-02-21 RX ORDER — CHLORHEXIDINE GLUCONATE 0.12 MG/ML
15 RINSE ORAL 2 TIMES DAILY
Qty: 420 ML | Refills: 0 | Status: SHIPPED | OUTPATIENT
Start: 2021-02-21 | End: 2021-03-07
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
## 2021-02-21 NOTE — ED PROVIDER NOTES
[facility] ED  Emergency Department Encounter  EmergencyMedicine Resident     Pt Name:Alfonzo Brown  MRN: [id number]  [place] 1990  Date of evaluation: 2/21/21  PCP:  No primary care provider on file. CHIEF COMPLAINT       Chief Complaint   Patient presents with    Dental Pain       HISTORY OF PRESENT ILLNESS  (Location/Symptom, Timing/Onset, Context/Setting, Quality, Duration, Modifying Factors, Severity.)      Jayson Rodriguez is a 31 y.o. male who presents with 2 days progressively worsening tooth/gum pain. 8/10 throbbing, sharp. Significant medical history for mandible surgery, wired jaw 8 days ago. Patient reports eating and drinking well, no chest pain, shortness of breath, fevers, vomiting, abdominal pain, diarrhea. Patient has been taking his oxycodone suspension last dose last night. Denies allergies. Denies smoking, alcohol, street drugs. PAST MEDICAL / SURGICAL / SOCIAL / FAMILY HISTORY      has a past medical history of Murmur, heart.     has a past surgical history that includes Mandible surgery (02/13/2021); Cardiac surgery (1989); and Mandible fracture surgery (N/A, 2/13/2021).     Social History     Socioeconomic History    Marital status: Single     Spouse name: Not on file    Number of children: Not on file    Years of education: Not on file    Highest education level: Not on file   Occupational History    Not on file   Social Needs    Financial resource strain: Not on file    Food insecurity     Worry: Not on file     Inability: Not on file    Transportation needs     Medical: Not on file     Non-medical: Not on file   Tobacco Use    Smoking status: Never Smoker   Substance and Sexual Activity    Alcohol use: Yes     Comment: social    Drug use: Yes     Types: Marijuana    Sexual activity: Not on file   Lifestyle    Physical activity     Days per week: Not on file     Minutes per session: Not on file    Stress: Not on file   Relationships  Social connections     Talks on phone: Not on file     Gets together: Not on file     Attends Hindu service: Not on file     Active member of club or organization: Not on file     Attends meetings of clubs or organizations: Not on file     Relationship status: Not on file    Intimate partner violence     Fear of current or ex partner: Not on file     Emotionally abused: Not on file     Physically abused: Not on file     Forced sexual activity: Not on file   Other Topics Concern    Not on file   Social History Narrative    Not on file       History reviewed. No pertinent family history. Allergies:  Patient has no known allergies. Home Medications:  Prior to Admission medications    Medication Sig Start Date End Date Taking? Authorizing Provider   Magic Mouthwash (MIRACLE MOUTHWASH) Swish and spit 5 mLs 4 times daily as needed for Irritation 2/21/21  Yes Nikkie Kirk MD   chlorhexidine (PERIDEX) 0.12 % solution Take 15 mLs by mouth 2 times daily for 14 days 2/21/21 3/7/21 Yes Nikkie Kirk MD   oxyCODONE (ROXICODONE) 5 MG/5ML solution Take 5 mLs by mouth every 8 hours as needed for Pain for up to 7 days. 2/14/21 2/21/21  Zee Anger, APRN - CNP   acetaminophen (TYLENOL) 160 MG/5ML solution Take 31.23 mLs by mouth every 8 hours for 7 days 2/14/21 2/21/21  Zee Anger, APRN - CNP   ibuprofen (ADVIL;MOTRIN) 100 MG/5ML suspension Take 20 mLs by mouth every 4 hours for 3 days 2/14/21 2/17/21  Zee Anger, APRN - CNP       REVIEW OF SYSTEMS    (2-9 systems for level 4, 10 or more for level 5)      Review of Systems   Constitutional: Negative for fever. HENT: Positive for mouth pain  Eyes: Negative for photophobia. Respiratory: Negative for shortness of breath. Cardiovascular: Negative for chest pain. Gastrointestinal: Negative for abdominal pain and vomiting. Endocrine: Negative for polyuria. Genitourinary: Negative for dysuria.    Musculoskeletal: Negative for arthralgias. Skin: Negative for color change. Allergic/Immunologic: Negative for immunocompromised state. Neurological: Negative for dizziness. Hematological: Does not bruise/bleed easily. Psychiatric/Behavioral: Negative for agitation. PHYSICAL EXAM   (up to 7 for level 4, 8 or more for level 5)      INITIAL VITALS:   /84   Pulse 78   Temp 98.4 °F (36.9 °C) (Tympanic)   Resp 16   SpO2 100%     Physical Exam  Constitutional:       General: Not in acute distress. Appearance: Normal appearance. Normal weight. Not toxic-appearing. HENT:      Head: Normocephalic and atraumatic. Nose: Nose normal.      Mouth/Throat: Mucous membranes are moist.  Jaw wired shut, unable to evaluate posterior oropharynx, mouth. There is some gingivitis, inflammation irritation at the gingival buccal line with erythema. No ulcerations drainage or pus. Eyes:      Extraocular Movements: Extraocular movements intact. Conjunctiva/sclera: Conjunctivae normal.      Pupils: Pupils are equal, round, and reactive to light. Neck:      Musculoskeletal: Normal range of motion and neck supple. No neck rigidity. Cardiovascular:      Rate and Rhythm: Normal rate and regular rhythm. Pulses: Normal pulses. Heart sounds: Normal heart sounds. No murmur. Pulmonary:      Effort: Pulmonary effort is normal.      Breath sounds: Normal breath sounds. No wheezing. Abdominal:      General: Abdomen is flat. Bowel sounds are normal.      Tenderness: There is no abdominal tenderness. Musculoskeletal:     Normal range of motion. General: No swelling or tenderness. No LE edema    Skin:     General: Skin is warm. Capillary Refill: Capillary refill takes less than 2 seconds. Coloration: Skin is not jaundiced. Neurological:      General: No focal deficit present. Mental Status: Alert and oriented to person, place, and time. Mental status is at baseline.       Motor: No weakness. DIFFERENTIAL  DIAGNOSIS     PLAN (LABS / IMAGING / EKG):  No orders of the defined types were placed in this encounter. MEDICATIONS ORDERED:  Orders Placed This Encounter   Medications    Magic Mouthwash (MIRACLE MOUTHWASH)     Sig: Swish and spit 5 mLs 4 times daily as needed for Irritation     Dispense:  240 mL     Refill:  0    chlorhexidine (PERIDEX) 0.12 % solution     Sig: Take 15 mLs by mouth 2 times daily for 14 days     Dispense:  420 mL     Refill:  0           DIAGNOSTIC RESULTS / EMERGENCY DEPARTMENT COURSE / MDM     LABS:  No results found for this visit on 02/21/21. RADIOLOGY:  None    EKG  None    All EKG's are interpreted by the Emergency Department Physician who either signs or Co-signs this chart in the absence of a cardiologist.    EMERGENCY DEPARTMENT COURSE:  Patient breathing quietly and unlabored on room air. Speech is normal and speaking in full sentences without requiring to pause to take a breath. Afebrile vital signs stable. Clinically patient appears well. Exam as above. There is some erythematous inflamed tissue at the gingival buccal recess. Likely due to food collection or irritation from hardware. There are no sharp exposed edges on mouth hardware. Will prescribe Magic mouthwash and chlorhexidine wash. Instructed the patient how to use properly. He understands and agrees. PROCEDURES:  None    CONSULTS:  None    CRITICAL CARE:  None    FINAL IMPRESSION      1.  Acute gingivitis          DISPOSITION / PLAN     DISPOSITION Decision To Discharge 02/21/2021 11:32:16 AM      PATIENT REFERRED TO:  [address] [place] [id number] [phone]  Schedule an appointment as soon as possible for a visit in 2 days        DISCHARGE MEDICATIONS:  Discharge Medication List as of 2/21/2021 11:52 AM      START taking these medications    Details   Magic Mouthwash (MIRACLE MOUTHWASH) Swish and spit 5 mLs 4 times daily as

## 2021-02-22 ENCOUNTER — CARE COORDINATION (OUTPATIENT)
Dept: CARE COORDINATION | Age: 31
End: 2021-02-22

## 2021-02-22 NOTE — CARE COORDINATION
Left VM message asking patient to call me back at [phone] for ED follow up. Visit not related to COVID or other viral illness, no further attempts will be made to contact.

## 2021-03-02 ENCOUNTER — OFFICE VISIT (OUTPATIENT)
Dept: BURN CARE | Age: 31
End: 2021-03-02

## 2021-03-02 VITALS
BODY MASS INDEX: 23.1 KG/M2 | DIASTOLIC BLOOD PRESSURE: 78 MMHG | TEMPERATURE: 97.6 F | WEIGHT: 165 LBS | SYSTOLIC BLOOD PRESSURE: 126 MMHG | HEART RATE: 73 BPM | HEIGHT: 71 IN

## 2021-03-02 DIAGNOSIS — Z00.00 ROUTINE PHYSICAL EXAMINATION: Primary | ICD-10-CM

## 2021-03-02 PROCEDURE — 99024 POSTOP FOLLOW-UP VISIT: CPT | Performed by: PLASTIC SURGERY

## 2021-03-02 NOTE — PATIENT INSTRUCTIONS
Send to private office to replace bands in jaw-patient has difficulty in transportation to [facility]-bands replaced today in clinic
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
## 2021-03-02 NOTE — LETTER
[address] [facility] [address] [facility] [address]  Phone: [phone]  Fax: [phone]    Kelley Stewart MD        March 2, 2021     Patient: David Appiah   YOB: 1990   Date of Visit: 3/2/2021       To Whom It May Concern: It is my medical opinion that Xander Perazadie. may return to work March 8 2021. If you have any questions or concerns, please don't hesitate to call.     Sincerely,        Kelley Stewart MD

## 2021-03-23 NOTE — PROGRESS NOTES
DAY OF SURGERY/PROCEDURE  GUIDELINES    As a patient at the [facility] you can expect quality medical and nursing care that is centered on your individual needs. It is our goal to make your surgical experience as comfortable and excellent as possible.  ________________________________________________________________________    The following instructions are general guidelines, if any information on this sheet is different from what your doctor has instructed you to do, please follow your doctor's instructions. · Please arrive on time or your procedure may be rescheduled  · Enter through front entrance of the building. Surgery Center will be located to your right. · Upon arrival you will be taken to the pre-operative area to get ready for surgery, your family will stay in the waiting room and visit with you once you are ready for surgery. Due to special limitations please limit visitation to 1-2 members of your family at a time. When it is time for surgery your family will return to the waiting room. · You will be given a specific time when you will NOT be able to eat, drink, smoke, suck or chew ANYTHING (no water, gum, mints, cigarettes, cigars, pipes, snuff, chewing tobacco, etc.) or your surgery may be canceled. This will occur during your PAT appointment or by phone 1-2 days before surgery  · Take a shower or bath on the morning of your surgery/procedure (Hibiclens if directed)  · Brush your teeth, but do not swallow any water  · IN CASE OF ILLNESS - If you have a cold or flu symptoms (high fever, runny nose, sore throat, cough, etc.) rash, nausea, vomiting, loose stools, and/or recent contact with someone who has a contagious disease (chick pox, measles, etc.) please call your doctor before coming to the surgery center  · Take a small sip of water with heart, blood pressure, and/or seizure medication the morning of surgery.  (DO NOT take blood pressure medications that contain a diuretic)  · If applicable bring your:  · Inhaler (s)  · Hearing aid(s)  · Eyeglasses and Case (If you wear contacts they have to be removed before surgery, bring case and solution)  · Any paperwork given to you by your doctor  · Any X-rays you were told to bring  · A copy of your Living Will or Durable Power of   · DO NOT take anticoagulants (blood thinners, aspirin or aspirin-containing products) two weeks prior to your surgery. You may start taking again 2 days post-operatively, unless otherwise directed by your doctor. · DO NOT take any diabetic pills or insulin. Please bring your sliding scale instructions and sick day plan with you. If you have a low blood sugar reaction after midnight, drink 4 ounces of apple juice or regular pop. · Wear loose, comfortable clothing that is easy to put on and take off. A locker will be provided to store your clothing during the procedure. The key can be given to a family member/friend or it will remain in post-op with the nurse. · You will be returning home the same day as your surgery, you will need to have a responsible adult (25years of age or older) present to drive you home. You will need someone stay with you at home for the first 24 hours following your surgery. This is due to the anesthesia and the medication given to you during surgery and recovery. · Your doctor may talk with your family immediately following your procedure. Depending on your needs, you will stay in the recovery room between 30 minutes to 2 hours. · While you are recovering, the surgery family waiting room  can answer many of your family's questions. All medically related questions will be answered by a medical professional. If you had outpatient surgery, you will meet your family for discharge instructions before leaving.

## 2021-03-31 ENCOUNTER — ANESTHESIA EVENT (OUTPATIENT)
Dept: OPERATING ROOM | Age: 31
End: 2021-03-31

## 2021-04-07 ENCOUNTER — ANESTHESIA (OUTPATIENT)
Dept: OPERATING ROOM | Age: 31
End: 2021-04-07

## 2021-04-07 ENCOUNTER — HOSPITAL ENCOUNTER (OUTPATIENT)
Age: 31
Setting detail: OUTPATIENT SURGERY
Discharge: HOME | End: 2021-04-07
Attending: PLASTIC SURGERY | Admitting: PLASTIC SURGERY

## 2021-04-07 VITALS
WEIGHT: 181.13 LBS | HEART RATE: 65 BPM | DIASTOLIC BLOOD PRESSURE: 88 MMHG | TEMPERATURE: 96.8 F | BODY MASS INDEX: 25.36 KG/M2 | RESPIRATION RATE: 20 BRPM | SYSTOLIC BLOOD PRESSURE: 125 MMHG | OXYGEN SATURATION: 97 % | HEIGHT: 71 IN

## 2021-04-07 VITALS — SYSTOLIC BLOOD PRESSURE: 108 MMHG | DIASTOLIC BLOOD PRESSURE: 60 MMHG | OXYGEN SATURATION: 100 %

## 2021-04-07 LAB
SARS-COV-2, RAPID: NOT DETECTED
SPECIMEN DESCRIPTION: NORMAL

## 2021-04-07 PROCEDURE — 3700000000 HC ANESTHESIA ATTENDED CARE: Performed by: PLASTIC SURGERY

## 2021-04-07 PROCEDURE — 3600000002 HC SURGERY LEVEL 2 BASE: Performed by: PLASTIC SURGERY

## 2021-04-07 PROCEDURE — 7100000000 HC PACU RECOVERY - FIRST 15 MIN: Performed by: PLASTIC SURGERY

## 2021-04-07 PROCEDURE — 6360000002 HC RX W HCPCS: Performed by: NURSE ANESTHETIST, CERTIFIED REGISTERED

## 2021-04-07 PROCEDURE — 7100000010 HC PHASE II RECOVERY - FIRST 15 MIN: Performed by: PLASTIC SURGERY

## 2021-04-07 PROCEDURE — 87635 SARS-COV-2 COVID-19 AMP PRB: CPT

## 2021-04-07 PROCEDURE — 3600000012 HC SURGERY LEVEL 2 ADDTL 15MIN: Performed by: PLASTIC SURGERY

## 2021-04-07 PROCEDURE — 2500000003 HC RX 250 WO HCPCS: Performed by: NURSE ANESTHETIST, CERTIFIED REGISTERED

## 2021-04-07 PROCEDURE — 2580000003 HC RX 258: Performed by: ANESTHESIOLOGY

## 2021-04-07 PROCEDURE — 3700000001 HC ADD 15 MINUTES (ANESTHESIA): Performed by: PLASTIC SURGERY

## 2021-04-07 PROCEDURE — 6360000002 HC RX W HCPCS: Performed by: PLASTIC SURGERY

## 2021-04-07 PROCEDURE — 7100000011 HC PHASE II RECOVERY - ADDTL 15 MIN: Performed by: PLASTIC SURGERY

## 2021-04-07 PROCEDURE — 2709999900 HC NON-CHARGEABLE SUPPLY: Performed by: PLASTIC SURGERY

## 2021-04-07 RX ORDER — ONDANSETRON 2 MG/ML
4 INJECTION INTRAMUSCULAR; INTRAVENOUS
Status: DISCONTINUED | OUTPATIENT
Start: 2021-04-07 | End: 2021-04-07 | Stop reason: HOSPADM

## 2021-04-07 RX ORDER — BALANCED SALT SOLUTION ENRICHED WITH BICARBONATE, DEXTROSE, AND GLUTATHIONE
KIT INTRAOCULAR
Status: DISCONTINUED
Start: 2021-04-07 | End: 2021-04-07 | Stop reason: WASHOUT

## 2021-04-07 RX ORDER — SODIUM CHLORIDE 0.9 % (FLUSH) 0.9 %
10 SYRINGE (ML) INJECTION EVERY 12 HOURS SCHEDULED
Status: DISCONTINUED | OUTPATIENT
Start: 2021-04-07 | End: 2021-04-07 | Stop reason: HOSPADM

## 2021-04-07 RX ORDER — PROPOFOL 10 MG/ML
INJECTION, EMULSION INTRAVENOUS PRN
Status: DISCONTINUED | OUTPATIENT
Start: 2021-04-07 | End: 2021-04-07 | Stop reason: SDUPTHER

## 2021-04-07 RX ORDER — MORPHINE SULFATE 1 MG/ML
1 INJECTION, SOLUTION EPIDURAL; INTRATHECAL; INTRAVENOUS EVERY 5 MIN PRN
Status: DISCONTINUED | OUTPATIENT
Start: 2021-04-07 | End: 2021-04-07 | Stop reason: HOSPADM

## 2021-04-07 RX ORDER — FENTANYL CITRATE 50 UG/ML
INJECTION, SOLUTION INTRAMUSCULAR; INTRAVENOUS PRN
Status: DISCONTINUED | OUTPATIENT
Start: 2021-04-07 | End: 2021-04-07 | Stop reason: SDUPTHER

## 2021-04-07 RX ORDER — MIDAZOLAM HYDROCHLORIDE 1 MG/ML
INJECTION INTRAMUSCULAR; INTRAVENOUS PRN
Status: DISCONTINUED | OUTPATIENT
Start: 2021-04-07 | End: 2021-04-07 | Stop reason: SDUPTHER

## 2021-04-07 RX ORDER — SODIUM CHLORIDE 0.9 % (FLUSH) 0.9 %
10 SYRINGE (ML) INJECTION PRN
Status: DISCONTINUED | OUTPATIENT
Start: 2021-04-07 | End: 2021-04-07 | Stop reason: HOSPADM

## 2021-04-07 RX ORDER — SODIUM CHLORIDE, SODIUM LACTATE, POTASSIUM CHLORIDE, CALCIUM CHLORIDE 600; 310; 30; 20 MG/100ML; MG/100ML; MG/100ML; MG/100ML
INJECTION, SOLUTION INTRAVENOUS CONTINUOUS
Status: DISCONTINUED | OUTPATIENT
Start: 2021-04-07 | End: 2021-04-07 | Stop reason: HOSPADM

## 2021-04-07 RX ORDER — LIDOCAINE HYDROCHLORIDE 10 MG/ML
INJECTION, SOLUTION EPIDURAL; INFILTRATION; INTRACAUDAL; PERINEURAL PRN
Status: DISCONTINUED | OUTPATIENT
Start: 2021-04-07 | End: 2021-04-07 | Stop reason: SDUPTHER

## 2021-04-07 RX ORDER — MEPERIDINE HYDROCHLORIDE 50 MG/ML
12.5 INJECTION INTRAMUSCULAR; INTRAVENOUS; SUBCUTANEOUS EVERY 5 MIN PRN
Status: DISCONTINUED | OUTPATIENT
Start: 2021-04-07 | End: 2021-04-07 | Stop reason: HOSPADM

## 2021-04-07 RX ORDER — DIPHENHYDRAMINE HYDROCHLORIDE 50 MG/ML
12.5 INJECTION INTRAMUSCULAR; INTRAVENOUS
Status: DISCONTINUED | OUTPATIENT
Start: 2021-04-07 | End: 2021-04-07 | Stop reason: HOSPADM

## 2021-04-07 RX ORDER — HYDROCODONE BITARTRATE AND ACETAMINOPHEN 5; 325 MG/1; MG/1
2 TABLET ORAL PRN
Status: DISCONTINUED | OUTPATIENT
Start: 2021-04-07 | End: 2021-04-07 | Stop reason: HOSPADM

## 2021-04-07 RX ORDER — HYDROCODONE BITARTRATE AND ACETAMINOPHEN 5; 325 MG/1; MG/1
1 TABLET ORAL PRN
Status: DISCONTINUED | OUTPATIENT
Start: 2021-04-07 | End: 2021-04-07 | Stop reason: HOSPADM

## 2021-04-07 RX ORDER — SODIUM CHLORIDE 9 MG/ML
INJECTION, SOLUTION INTRAVENOUS CONTINUOUS
Status: DISCONTINUED | OUTPATIENT
Start: 2021-04-07 | End: 2021-04-07 | Stop reason: HOSPADM

## 2021-04-07 RX ORDER — PROMETHAZINE HYDROCHLORIDE 25 MG/ML
6.25 INJECTION, SOLUTION INTRAMUSCULAR; INTRAVENOUS
Status: DISCONTINUED | OUTPATIENT
Start: 2021-04-07 | End: 2021-04-07 | Stop reason: HOSPADM

## 2021-04-07 RX ORDER — FENTANYL CITRATE 50 UG/ML
25 INJECTION, SOLUTION INTRAMUSCULAR; INTRAVENOUS EVERY 5 MIN PRN
Status: DISCONTINUED | OUTPATIENT
Start: 2021-04-07 | End: 2021-04-07 | Stop reason: HOSPADM

## 2021-04-07 RX ORDER — HYDRALAZINE HYDROCHLORIDE 20 MG/ML
5 INJECTION INTRAMUSCULAR; INTRAVENOUS EVERY 10 MIN PRN
Status: DISCONTINUED | OUTPATIENT
Start: 2021-04-07 | End: 2021-04-07 | Stop reason: HOSPADM

## 2021-04-07 RX ADMIN — MIDAZOLAM HYDROCHLORIDE 2 MG: 1 INJECTION, SOLUTION INTRAMUSCULAR; INTRAVENOUS at 11:29

## 2021-04-07 RX ADMIN — PROPOFOL 50 MG: 10 INJECTION, EMULSION INTRAVENOUS at 11:34

## 2021-04-07 RX ADMIN — SODIUM CHLORIDE, POTASSIUM CHLORIDE, SODIUM LACTATE AND CALCIUM CHLORIDE: 600; 310; 30; 20 INJECTION, SOLUTION INTRAVENOUS at 11:01

## 2021-04-07 RX ADMIN — PROPOFOL 50 MG: 10 INJECTION, EMULSION INTRAVENOUS at 11:33

## 2021-04-07 RX ADMIN — LIDOCAINE HYDROCHLORIDE 50 MG: 10 INJECTION, SOLUTION EPIDURAL; INFILTRATION; INTRACAUDAL; PERINEURAL at 11:33

## 2021-04-07 RX ADMIN — FENTANYL CITRATE 50 MCG: 50 INJECTION, SOLUTION INTRAMUSCULAR; INTRAVENOUS at 11:32

## 2021-04-07 RX ADMIN — PROPOFOL 50 MG: 10 INJECTION, EMULSION INTRAVENOUS at 11:35

## 2021-04-07 RX ADMIN — CEFAZOLIN 2000 MG: 10 INJECTION, POWDER, FOR SOLUTION INTRAVENOUS at 11:37

## 2021-04-07 RX ADMIN — PROPOFOL 50 MG: 10 INJECTION, EMULSION INTRAVENOUS at 11:37

## 2021-04-07 RX ADMIN — FENTANYL CITRATE 50 MCG: 50 INJECTION, SOLUTION INTRAMUSCULAR; INTRAVENOUS at 11:40

## 2021-04-07 RX ADMIN — PROPOFOL 50 MG: 10 INJECTION, EMULSION INTRAVENOUS at 11:36

## 2021-04-07 ASSESSMENT — PAIN SCALES - GENERAL
PAINLEVEL_OUTOF10: 0

## 2021-04-07 ASSESSMENT — PULMONARY FUNCTION TESTS
PIF_VALUE: 24
PIF_VALUE: 1
PIF_VALUE: 22
PIF_VALUE: 21
PIF_VALUE: 1
PIF_VALUE: 7
PIF_VALUE: 24
PIF_VALUE: 25
PIF_VALUE: 1
PIF_VALUE: 22
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
## 2021-04-07 NOTE — ANESTHESIA POSTPROCEDURE EVALUATION
POST- ANESTHESIA EVALUATION       Pt Name: Kee Stockton  MRN: [id number]  [place]: 1990  Date of evaluation: 4/7/2021  Time:  12:38 PM      /88   Pulse 65   Temp 96.8 °F (36 °C)   Resp 20   Ht 5' 11\" (1.803 m)   Wt 181 lb 2 oz (82.2 kg)   SpO2 97%   BMI 25.26 kg/m²      Consciousness Level  Awake  Cardiopulmonary Status  Stable  Pain Adequately Treated YES  Nausea / Vomiting  NO  Adequate Hydration  YES  Anesthesia Related Complications NONE      Electronically signed by Elizabeth Cade MD on 4/7/2021 at 12:38 PM       Department of Anesthesiology  Postprocedure Note    Patient: Kee Stockton  MRN: [id number]  YOB: 1990  Date of evaluation: 4/7/2021  Time:  12:38 PM     Procedure Summary     Date: 04/07/21 Room / Location: [address] [place] [address] 03 / [address] [place] [address]    Anesthesia Start: 5004 Anesthesia Stop: 7981    Procedure: [address] [place] [address] (N/A ) Diagnosis: (MANDIBLE FRACTURE)    Surgeons: Greer Griffin MD Responsible Provider: Elizabeth Cade MD    Anesthesia Type: general ASA Status: 2          Anesthesia Type: general    Ra Phase I: Ra Score: 9    Ra Phase II: Ra Score: 9    Last vitals: Reviewed and per EMR flowsheets.        Anesthesia Post Evaluation

## 2021-04-07 NOTE — H&P
Subjective:   Patient ID: Jeni Collins is a 31 y.o. male. HPI   Patient presents today following jaw fx sx was 02/13/2021 arch bars placement. He overall is doing well has pain/discomfort in the gums/teeth. He overall is doing well jaw tracking well. Patient says teeth fitting properly. Review of Systems   Constitutional: Negative. HENT: Negative for congestion and trouble swallowing. Respiratory: Negative for cough and shortness of breath. Cardiovascular: Negative for chest pain. Gastrointestinal: Negative for abdominal pain. Neurological: Negative for light-headedness and headaches. Psychiatric/Behavioral: Negative for dysphoric mood. Medical History    Medical History    Diagnosis Date Comment Source   Murmur, heart         Other Medical History    No past medical history on file. Family History    No family history on file. Social History    Tobacco History    Smoking Status   Never Smoker   Smokeless Tobacco Use   Never Used   Alcohol History    Alcohol Use Status   Yes Comment   social   Drug Use    Drug Use Status   Yes Types   Marijuana Comment   3 mos ago   Sexual Activity    Sexually Active   Yes    Surgical History    Procedure Laterality Date Comment Source   CARDIAC SURGERY  1989 heart murmur as a child    MANDIBLE FRACTURE SURGERY N/A 2/13/2021 ARCH BAR FIXATION TO MANDIBLE FRACTURE performed by Chon Dhillon MD at [address] [place] [address]  08/77/4786 ARCH BAR APPLICATION    E-Cigarettes/Vaping Use    Questions   E-Cigarette/Vaping Use   Responses: Never Assessed   Start Date   Passive Exposure   Quit Date   Counseling Given   Comments   Socioeconomic History    Employment History    No employment history on file.    Family and Education    Marital Status   Single   Social Identity    Preferred Language Ethnicity Race   English Non-/Non  Black      Financial Resource Strain    No financial resource strain value on [employer] Insecurity    No food insecurity information on file   Transportation Needs    No transportation needs information on file        Outpatient Medications       Magic Mouthwash (MIRACLE MOUTHWASH)  Take 31.23 mLs by mouth every 8 hours for 7 days   ibuprofen (ADVIL;MOTRIN) 100 MG/5ML suspension()           Objective:   Physical Exam   Vitals signs and nursing note reviewed. Exam conducted with a chaperone present. Constitutional:   Appearance: Normal appearance. HENT:   Head: Normocephalic and atraumatic. Comments:   Arch bars with bands intact. Rubber bands removed. Mandible tracking well. Good alignment of occlusion. Mouth/Throat:   Mouth: Mucous membranes are moist.   Eyes:   Extraocular Movements: Extraocular movements intact. Conjunctiva/sclera: Conjunctivae normal.   Pupils: Pupils are equal, round, and reactive to light. Pulmonary:   Effort: Pulmonary effort is normal.   Skin:   General: Skin is warm and dry. Neurological:   General: No focal deficit present. Mental Status: He is alert and oriented to person, place, and time. Assessment:     Bilateral mandible fractures. Plan:       Scheduled for removal of arch bars. I have discussed with the patient the indication, alternatives, and the possible risks and/or complications which include but are not limited to those delineated on the consent form for the planned procedure and the anesthesia methods. All questions were answered to patient's satisfaction. Consent was reviewed and signed.

## 2021-04-07 NOTE — BRIEF OP NOTE
Brief Postoperative Note      Patient: Gaby Bertrand  YOB: 1990  MRN: [id number]    Date of Procedure: 4/7/2021    Pre-Op Diagnosis: MANDIBLE FRACTURE    Post-Op Diagnosis: Same       Procedure(s):  ARCH BAR REMOVAL    Surgeon(s):  Ellen Brown MD    Assistant:  * No surgical staff found *    Anesthesia: General    Estimated Blood Loss (mL): Minimal    Complications: None    Specimens:   * No specimens in log *    Implants:  Implant Name Type Inv.  Item Serial No.  Lot No. LRB No. Used Action   BAR FIX 13 CM ARCH TRICIA  BAR FIX 13 CM ARCH TRICIA  [employer]-WD  N/A 2 Explanted   26 GAUGE WIRE DS-26      N/A 1 Explanted         Drains: * No LDAs found *    Findings:     Electronically signed by Ellen Brown MD on 4/7/2021 at 11:44 AM

## 2021-04-09 NOTE — OP NOTE
[address] [facility]                                OPERATIVE REPORT    PATIENT NAME: Horace Milton                  :        1990  MED REC NO:   [id number]                             ROOM:  ACCOUNT NO:   [de-identified]                           ADMIT DATE: 2021  PROVIDER:     Evan Gomez    DATE OF PROCEDURE:  2021    PREOPERATIVE DIAGNOSIS:  History of mandible fracture, treated with  plating and arch bars. POSTOPERATIVE DIAGNOSIS:  History of mandible fracture, treated with  plating and arch bars. PROCEDURE PERFORMED:  Removal of arch bars. SURGEON:  Evan Gomez MD    ANESTHESIA:  General per mask. INDICATIONS:  The patient is a 69-year-old male, who now is adequately  healed from his mandible fracture. He no longer requires the arch bars. So he is here for their removal.  The procedure, the risks, the benefits  were discussed with him. All questions were answered to his  satisfaction. Consent was signed. NARRATIVE SUMMARY:  The patient was brought to the operating suite,  placed in the supine position, general anesthetic per mask. Once  anesthesia was complete, the procedure was carried out. Base of each  wire anchoring the arch bars was untwisted, cut, and withdrawn; and  finally the arch bars were withdrawn accomplished between mask breaths. All  wires were verified removed. The patient tolerated the procedure well. Blood loss minimal.  Specimens, none. Complications, none. The patient  was transferred to Recovery in stable condition.         Oliva Jean    D: 2021 17:43:22       T: 2021 22:50:38     [id number]  Job#: [id number]     Doc#: [id number]    CC:

## 2022-08-10 ENCOUNTER — HOSPITAL ENCOUNTER (EMERGENCY)
Age: 32
Discharge: HOME | End: 2022-08-10
Attending: EMERGENCY MEDICINE

## 2022-08-10 VITALS
DIASTOLIC BLOOD PRESSURE: 78 MMHG | OXYGEN SATURATION: 100 % | TEMPERATURE: 98.2 F | HEART RATE: 72 BPM | RESPIRATION RATE: 19 BRPM | SYSTOLIC BLOOD PRESSURE: 119 MMHG

## 2022-08-10 DIAGNOSIS — K08.89 PAIN, DENTAL: Primary | ICD-10-CM

## 2022-08-10 PROCEDURE — 99283 EMERGENCY DEPT VISIT LOW MDM: CPT

## 2022-08-10 PROCEDURE — 6370000000 HC RX 637 (ALT 250 FOR IP): Performed by: HEALTH CARE PROVIDER

## 2022-08-10 RX ORDER — ACETAMINOPHEN 500 MG
1000 TABLET ORAL 3 TIMES DAILY
Qty: 90 TABLET | Refills: 0 | Status: SHIPPED | OUTPATIENT
Start: 2022-08-10 | End: 2022-09-06

## 2022-08-10 RX ORDER — PENICILLIN V POTASSIUM 500 MG/1
500 TABLET ORAL 4 TIMES DAILY
Qty: 27 TABLET | Refills: 0 | Status: SHIPPED | OUTPATIENT
Start: 2022-08-10 | End: 2022-08-17

## 2022-08-10 RX ORDER — PENICILLIN V POTASSIUM 250 MG/1
500 TABLET ORAL ONCE
Status: COMPLETED | OUTPATIENT
Start: 2022-08-10 | End: 2022-08-10

## 2022-08-10 RX ORDER — ACETAMINOPHEN 500 MG
1000 TABLET ORAL ONCE
Status: COMPLETED | OUTPATIENT
Start: 2022-08-10 | End: 2022-08-10

## 2022-08-10 RX ORDER — IBUPROFEN 800 MG/1
800 TABLET ORAL ONCE
Status: COMPLETED | OUTPATIENT
Start: 2022-08-10 | End: 2022-08-10

## 2022-08-10 RX ORDER — IBUPROFEN 600 MG/1
600 TABLET ORAL EVERY 6 HOURS PRN
Qty: 30 TABLET | Refills: 0 | Status: SHIPPED | OUTPATIENT
Start: 2022-08-10 | End: 2022-09-06

## 2022-08-10 RX ADMIN — PENICILLIN V POTASSIUM 500 MG: 250 TABLET ORAL at 12:50

## 2022-08-10 RX ADMIN — IBUPROFEN 800 MG: 800 TABLET, FILM COATED ORAL at 12:50

## 2022-08-10 RX ADMIN — BENZOCAINE 1 EACH: 220 GEL, DENTIFRICE DENTAL at 12:50

## 2022-08-10 RX ADMIN — ACETAMINOPHEN 1000 MG: 500 TABLET ORAL at 12:50

## 2022-08-10 ASSESSMENT — ENCOUNTER SYMPTOMS
FACIAL SWELLING: 1
DIARRHEA: 0
CONSTIPATION: 0
ABDOMINAL PAIN: 0
NAUSEA: 0
VOMITING: 0
SHORTNESS OF BREATH: 0
SORE THROAT: 0

## 2022-08-10 NOTE — ED PROVIDER NOTES
[facility] ED  Emergency Department Encounter  Emergency Medicine Resident     Pt Name: Rach Varela  MRN: [id number]  Piotrgfcarleen 1990  Date of evaluation: 8/10/22  PCP:  No primary care provider on file. CHIEF COMPLAINT       Chief Complaint   Patient presents with    Dental Pain       HISTORY OFPRESENT ILLNESS  (Location/Symptom, Timing/Onset, Context/Setting, Quality, Duration, Modifying Factors,Severity.)      Rach Varela is a 27 y.o. male who presents with dental pain. Patient is morning with some right-sided dental pain. States has been intermittent over the last couple weeks. Has not seen a dentist in 2 years. Denies any recent injuries but does have a hx of jaw fracture. Denies any high fevers, chills, difficulty drinking or swallowing or handling secretions. PAST MEDICAL / SURGICAL / SOCIAL / FAMILY HISTORY      has a past medical history of Murmur, heart.     has a past surgical history that includes Mandible surgery (02/13/2021); Mandible fracture surgery (N/A, 2/13/2021); Cardiac surgery (1989); Mandible fracture surgery (N/A, 04/07/2021); and Mouth surgery (N/A, 4/7/2021).     Social History     Socioeconomic History    Marital status: Single     Spouse name: Not on file    Number of children: Not on file    Years of education: Not on file    Highest education level: Not on file   Occupational History    Not on file   Tobacco Use    Smoking status: Never    Smokeless tobacco: Never   Vaping Use    Vaping Use: Not on file   Substance and Sexual Activity    Alcohol use: Yes     Comment: social    Drug use: Yes     Types: Marijuana Wilma Felix)     Comment: 3 mos ago    Sexual activity: Yes   Other Topics Concern    Not on file   Social History Narrative    Not on file     Social Determinants of Health     Financial Resource Strain: Not on file   Food Insecurity: Not on file   Transportation Needs: Not on file   Physical Activity: Not on file   Stress: Not on file Social Connections: Not on file   Intimate Partner Violence: Not on file   Housing Stability: Not on file       No family history on file. Allergies:  Patient has no known allergies. Home Medications:  Prior to Admission medications    Medication Sig Start Date End Date Taking? Authorizing Provider   penicillin v potassium (VEETID) 500 MG tablet Take 1 tablet by mouth in the morning and 1 tablet at noon and 1 tablet in the evening and 1 tablet before bedtime. Do all this for 7 days. 8/10/22 8/17/22 Yes Filomena Larsen, DO   acetaminophen (TYLENOL) 500 MG tablet Take 2 tablets by mouth in the morning and 2 tablets at noon and 2 tablets before bedtime. 8/10/22  Yes Filomena Larsen, DO   ibuprofen (ADVIL;MOTRIN) 600 MG tablet Take 1 tablet by mouth every 6 hours as needed for Pain 8/10/22  Yes Filomena Larsen, DO   Magic Mouthwash (MIRACLE MOUTHWASH) Swish and spit 5 mLs 4 times daily as needed for Irritation 2/21/21   Raisa Juarez MD       REVIEW OF SYSTEMS    (2-9 systems for level 4, 10 or more for level 5)      Review of Systems   Constitutional:  Negative for chills and fever. HENT:  Positive for dental problem and facial swelling. Negative for ear pain, hearing loss and sore throat. Eyes:  Negative for visual disturbance. Respiratory:  Negative for shortness of breath. Cardiovascular:  Negative for chest pain. Gastrointestinal:  Negative for abdominal pain, constipation, diarrhea, nausea and vomiting. Genitourinary:  Negative for difficulty urinating and dysuria. Musculoskeletal:  Negative for arthralgias and myalgias. Neurological:  Negative for numbness. Psychiatric/Behavioral:  Negative for agitation and confusion. PHYSICAL EXAM   (up to 7 for level 4, 8 or more for level 5)     INITIAL VITALS:    temperature is 98.2 °F (36.8 °C). His blood pressure is 119/78 and his pulse is 72. His respiration is 19 and oxygen saturation is 100%.      Physical Exam  Vitals and nursing note reviewed. Constitutional:       General: He is not in acute distress. Appearance: Normal appearance. He is well-developed. He is not ill-appearing or diaphoretic. HENT:      Head: Normocephalic and atraumatic. Jaw: No trismus, tenderness or swelling. Right Ear: External ear normal.      Left Ear: External ear normal.      Nose: Nose normal.      Mouth/Throat:      Mouth: Mucous membranes are moist.      Dentition: Dental tenderness, gingival swelling and dental caries present. No dental abscesses or gum lesions. Tongue: No lesions. Tongue does not deviate from midline. Pharynx: Oropharynx is clear. No pharyngeal swelling, oropharyngeal exudate, posterior oropharyngeal erythema or uvula swelling. Tonsils: No tonsillar exudate or tonsillar abscesses. Comments: Poor dentition  Eyes:      Extraocular Movements: Extraocular movements intact. Conjunctiva/sclera: Conjunctivae normal.   Neck:      Trachea: No tracheal deviation. Cardiovascular:      Rate and Rhythm: Normal rate and regular rhythm. Pulmonary:      Effort: Pulmonary effort is normal. No respiratory distress. Abdominal:      General: Abdomen is flat. There is no distension. Musculoskeletal:         General: No swelling, deformity or signs of injury. Normal range of motion. Cervical back: Normal range of motion and neck supple. Skin:     General: Skin is warm and dry. Coloration: Skin is not jaundiced. Findings: No bruising or lesion. Neurological:      General: No focal deficit present. Mental Status: He is alert and oriented to person, place, and time. Mental status is at baseline. Motor: No abnormal muscle tone. DIFFERENTIAL  DIAGNOSIS     PLAN (LABS / IMAGING / EKG):  No orders of the defined types were placed in this encounter.       MEDICATIONS ORDERED:  Orders Placed This Encounter   Medications    penicillin v potassium (VEETID) tablet 500 mg     Order Specific Question:   Antimicrobial Indications     Answer:   Head and Neck Infection    acetaminophen (TYLENOL) tablet 1,000 mg    ibuprofen (ADVIL;MOTRIN) tablet 800 mg    benzocaine (LOLLICAINE) 20 % dental swab 1 each    penicillin v potassium (VEETID) 500 MG tablet     Sig: Take 1 tablet by mouth in the morning and 1 tablet at noon and 1 tablet in the evening and 1 tablet before bedtime. Do all this for 7 days. Dispense:  27 tablet     Refill:  0    acetaminophen (TYLENOL) 500 MG tablet     Sig: Take 2 tablets by mouth in the morning and 2 tablets at noon and 2 tablets before bedtime. Dispense:  90 tablet     Refill:  0    ibuprofen (ADVIL;MOTRIN) 600 MG tablet     Sig: Take 1 tablet by mouth every 6 hours as needed for Pain     Dispense:  30 tablet     Refill:  0       DDX: dental carries, no apical/periapical abscess    DIAGNOSTIC RESULTS / EMERGENCY DEPARTMENT COURSE / MDM     LABS:  Labs Reviewed - No data to display      RADIOLOGY:  No results found. EMERGENCY DEPARTMENT COURSE:     MDM/Plan: 27 y.o. male who presents with dental pain and jaw swelling. Time initial examination patient was in no acute distress, vital signs are stable. He is afebrile. He rated pain. Physical exam pertinent for some mild gingival swelling, overall poor dentition, dental caries and erosion noted to mandibular molars. No signs of any abscesses or periapical abscesses for drainage. Plan is for analgesia, antibiotics, dental follow-up. Follow up plans and ER return precautions discussed. Patient verbalized understanding and had no further questions at time of discharge. PROCEDURES:  None    CONSULTS:  None    CRITICAL CARE:  Please see attending note    FINAL IMPRESSION      1.  Pain, dental        DISPOSITION / PLAN     DISPOSITION Decision To Discharge 08/10/2022 12:46:25 PM    PATIENTREFERRED TO:  [facility]  [address] [place] [address]  [phone]  Go to   As needed, If symptoms worsen    DISCHARGE MEDICATIONS:  New Prescriptions    ACETAMINOPHEN (TYLENOL) 500 MG TABLET    Take 2 tablets by mouth in the morning and 2 tablets at noon and 2 tablets before bedtime. IBUPROFEN (ADVIL;MOTRIN) 600 MG TABLET    Take 1 tablet by mouth every 6 hours as needed for Pain    PENICILLIN V POTASSIUM (VEETID) 500 MG TABLET    Take 1 tablet by mouth in the morning and 1 tablet at noon and 1 tablet in the evening and 1 tablet before bedtime. Do all this for 7 days.        Nicolas Love DO  EmergencyMedicine Resident    (Please note that portions of this note were completed with a voice recognition program.  Efforts were made to edit the dictations but occasionally words are mis-transcribed.)      Frieda Barry DO  Resident  08/10/22 1228

## 2022-08-10 NOTE — ED TRIAGE NOTES
Writer is extended triage nurse. Assessment and treatment for this patient was primarily performed by resident and attending with extended triage nurse performing tasks as directed. Pt seen primarily by resident and attending physicians. RN assessment deferred to physician assessment. Pt has had bilateral lower gum pain and wants to see the dentist. Adam Gautam notified.

## 2022-08-10 NOTE — ED NOTES
Writer met with patient at bedside regarding need for dental appointment. Referral completed and faxed to the [address] [place] [address] of [address] [place] [address]. Patient to be seen tomorrow, 8/11/22 at 0930.      DEVANTE Issa  08/10/22 [id number]

## 2022-08-10 NOTE — ED PROVIDER NOTES
[facility]                                Emergency Department                                               Faculty Attestation     I performed a history and physical examination of the patient and discussed management with the resident. I reviewed the residents note and agree with the documented findings and plan of care. Any areas of disagreement are noted on the chart. I was personally present for the key portions of any procedures. I have documented in the chart those procedures where I was not present during the key portions. I have reviewed the emergency nurses triage note. I agree with the chief complaint, past medical history, past surgical history, allergies, medications, social and family history as documented unless otherwise noted below. For Physician Assistant/ Nurse Practitioner cases/documentation I have personally evaluated this patient and have completed at least one if not all key elements of the E/M (history, physical exam, and MDM). Additional findings are as noted. This patient was evaluated in the Emergency Department for symptoms described in the history of present illness. He/she was evaluated in the context of the global COVID-19 pandemic, which necessitated consideration that the patient might be at risk for infection with the SARS-CoV-2 virus that causes COVID-19. Institutional protocols and algorithms that pertain to the evaluation of patients at risk for COVID-19 are in a state of rapid change based on information released by regulatory bodies including the CDC and federal and state organizations. These policies and algorithms were followed during the patient's care in the ED. 78-year-old male with overall poor dentition, uses cigarettes, states that he was post to have teeth pulled before coronavirus but then had a jaw fracture and due to COVID lost to follow-up presents emergency department bilateral upper dental pain.   Overall poor dentition, there is some dental erosion. Gingival irritation but no obvious dental abscess. No drooling stridor trismus or cervical lymphadenopathy.   Given resources for outpatient dental referral.  Pain control, abx     Critical Care: NONE    Peter Potter DO  Emergency Medicine Physician       Dora Coffey [id number],   08/10/22 6827

## 2022-08-10 NOTE — DISCHARGE INSTRUCTIONS
Thank you for visiting [facility] Department. You need to call your primary care physician to make an appointment as directed for follow up. If you do not have a primary care physician you can establish care with one of the family practices on the list provided to you. Please follow-up with the dental clinic as scheduled tomorrow morning. Should you have any questions regarding your care or further treatment, please call [facility] Emergency Department at [phone]. Take all medications as directed. PLEASE RETURN TO THE ED IMMEDIATELY for worsening symptoms, or if you develop any concerning symptoms such as: high fever not relieved by tylenol and/or motrin, chills, shortness of breath, chest pain, persistent nausea and/or vomiting, numbness, weakness or tingling in the arms or legs or change in color of the extremities, changes in mental status, persistent headache, blurry vision, inability to urinate, unable to follow up with your physician, or other any other  Care or concern.

## 2022-09-06 ENCOUNTER — APPOINTMENT (OUTPATIENT)
Dept: GENERAL RADIOLOGY | Age: 32
End: 2022-09-06

## 2022-09-06 ENCOUNTER — HOSPITAL ENCOUNTER (EMERGENCY)
Age: 32
Discharge: HOME | End: 2022-09-06
Attending: EMERGENCY MEDICINE

## 2022-09-06 VITALS
TEMPERATURE: 98.2 F | OXYGEN SATURATION: 99 % | SYSTOLIC BLOOD PRESSURE: 117 MMHG | HEART RATE: 97 BPM | DIASTOLIC BLOOD PRESSURE: 70 MMHG | RESPIRATION RATE: 12 BRPM

## 2022-09-06 DIAGNOSIS — M54.12 CERVICAL RADICULOPATHY: Primary | ICD-10-CM

## 2022-09-06 PROCEDURE — 72040 X-RAY EXAM NECK SPINE 2-3 VW: CPT

## 2022-09-06 PROCEDURE — 99283 EMERGENCY DEPT VISIT LOW MDM: CPT

## 2022-09-06 PROCEDURE — 6370000000 HC RX 637 (ALT 250 FOR IP): Performed by: HEALTH CARE PROVIDER

## 2022-09-06 RX ORDER — IBUPROFEN 800 MG/1
800 TABLET ORAL ONCE
Status: COMPLETED | OUTPATIENT
Start: 2022-09-06 | End: 2022-09-06

## 2022-09-06 RX ORDER — IBUPROFEN 600 MG/1
600 TABLET ORAL EVERY 6 HOURS PRN
Qty: 60 TABLET | Refills: 0 | Status: SHIPPED | OUTPATIENT
Start: 2022-09-06

## 2022-09-06 RX ORDER — CYCLOBENZAPRINE HCL 10 MG
10 TABLET ORAL 3 TIMES DAILY PRN
Qty: 15 TABLET | Refills: 0 | Status: SHIPPED | OUTPATIENT
Start: 2022-09-06 | End: 2022-09-11

## 2022-09-06 RX ORDER — ACETAMINOPHEN 500 MG
1000 TABLET ORAL ONCE
Status: COMPLETED | OUTPATIENT
Start: 2022-09-06 | End: 2022-09-06

## 2022-09-06 RX ORDER — ACETAMINOPHEN 500 MG
1000 TABLET ORAL 3 TIMES DAILY
Qty: 90 TABLET | Refills: 0 | Status: SHIPPED | OUTPATIENT
Start: 2022-09-06

## 2022-09-06 RX ADMIN — ACETAMINOPHEN 1000 MG: 500 TABLET ORAL at 19:51

## 2022-09-06 RX ADMIN — IBUPROFEN 800 MG: 800 TABLET, FILM COATED ORAL at 19:52

## 2022-09-06 NOTE — ED PROVIDER NOTES
[address] [facility]  ED  Emergency Department Encounter  Emergency Medicine Resident     Pt Name: Chaparrita Ramirez  MRN: [id number]  [place] 1990  Date of evaluation: 9/6/22  PCP:  No primary care provider on file. CHIEF COMPLAINT       Chief Complaint   Patient presents with    Arm Pain     Tingling and numbness radiating down right arm x3 days     Neck Pain       HISTORY OFPRESENT ILLNESS  (Location/Symptom, Timing/Onset, Context/Setting, Quality, Duration, Modifying Elby Salen.)      Chaparrita Ramirez is a 27 y.o. male who presents with numbness and tingling in his first 3 fingers as well as pain under his right arm. Patient has had some intermittent paresthesias of his first 3 fingers on his right hand for the last 7 days. Patient states as of the last 2 3 days he has some increasing pain that shoots down his right arm when he turns his head to the right. Patient denies any recent trauma. No history of any neck injuries. Denies any overt weakness in the bilateral upper extremities. Denies any fevers, chills, chest pain or shortness of breath, abdominal pain, nausea vomiting, diarrhea. PAST MEDICAL / SURGICAL / SOCIAL / FAMILY HISTORY      has a past medical history of Murmur, heart.     has a past surgical history that includes Mandible surgery (02/13/2021); Mandible fracture surgery (N/A, 2/13/2021); Cardiac surgery (1989); Mandible fracture surgery (N/A, 04/07/2021); and Mouth surgery (N/A, 4/7/2021).     Social History     Socioeconomic History    Marital status: Single     Spouse name: Not on file    Number of children: Not on file    Years of education: Not on file    Highest education level: Not on file   Occupational History    Not on file   Tobacco Use    Smoking status: Never    Smokeless tobacco: Never   Vaping Use    Vaping Use: Not on file   Substance and Sexual Activity    Alcohol use: Yes     Comment: social    Drug use: Yes     Types: Marijuana [employer] [place]) Comment: 3 mos ago    Sexual activity: Yes   Other Topics Concern    Not on file   Social History Narrative    Not on file     Social Determinants of Health     Financial Resource Strain: Not on file   Food Insecurity: Not on file   Transportation Needs: Not on file   Physical Activity: Not on file   Stress: Not on file   Social Connections: Not on file   Intimate Partner Violence: Not on file   Housing Stability: Not on file       No family history on file. Allergies:  Patient has no known allergies. Home Medications:  Prior to Admission medications    Medication Sig Start Date End Date Taking? Authorizing Provider   acetaminophen (TYLENOL) 500 MG tablet Take 2 tablets by mouth 3 times daily 9/6/22  Yes Valdez Boone, DO   ibuprofen (ADVIL;MOTRIN) 600 MG tablet Take 1 tablet by mouth every 6 hours as needed for Pain 9/6/22  Yes Valdez Boone DO   cyclobenzaprine (FLEXERIL) 10 MG tablet Take 1 tablet by mouth 3 times daily as needed for Muscle spasms 9/6/22 9/11/22 Yes Valdez Boone DO   Magic Mouthwash (MIRACLE MOUTHWASH) Swish and spit 5 mLs 4 times daily as needed for Irritation 2/21/21   Janusz Davis MD       REVIEW OF SYSTEMS    (2-9 systems for level 4, 10 or more for level 5)      Review of Systems   Constitutional:  Negative for chills and fever. HENT:  Negative for ear pain, hearing loss and sore throat. Eyes:  Negative for visual disturbance. Respiratory:  Negative for shortness of breath. Cardiovascular:  Negative for chest pain. Gastrointestinal:  Negative for abdominal pain, constipation, diarrhea, nausea and vomiting. Genitourinary:  Negative for difficulty urinating and dysuria. Musculoskeletal:  Negative for arthralgias and myalgias. Right arm pain   Neurological:  Negative for numbness. Paresthesias   Psychiatric/Behavioral:  Negative for agitation and confusion.       PHYSICAL EXAM   (up to 7 for level 4, 8 or more for level 5)     INITIAL VITALS:    oral temperature is 98.2 °F (36.8 °C). His blood pressure is 117/70 and his pulse is 97. His respiration is 12 and oxygen saturation is 99%. Physical Exam  Vitals and nursing note reviewed. Constitutional:       General: He is not in acute distress. Appearance: He is well-developed. He is not diaphoretic. HENT:      Head: Normocephalic and atraumatic. Right Ear: External ear normal.      Left Ear: External ear normal.      Nose: Nose normal.   Eyes:      Conjunctiva/sclera: Conjunctivae normal.   Neck:      Trachea: No tracheal deviation. Cardiovascular:      Rate and Rhythm: Normal rate and regular rhythm. Heart sounds: Normal heart sounds. No murmur heard. No friction rub. No gallop. Pulmonary:      Effort: Pulmonary effort is normal. No respiratory distress. Breath sounds: Normal breath sounds. Abdominal:      General: Bowel sounds are normal.      Palpations: Abdomen is soft. Tenderness: There is no abdominal tenderness. Musculoskeletal:         General: No tenderness. Normal range of motion. Cervical back: Neck supple. Comments: Hypertonicity with some point tenderness on the right trap, scalenes, some reproducibility of radiating pain on deep palpation of the scalenes   Skin:     General: Skin is warm and dry. Capillary Refill: Capillary refill takes less than 2 seconds. Neurological:      Mental Status: He is alert and oriented to person, place, and time. Motor: No abnormal muscle tone.        DIFFERENTIAL  DIAGNOSIS     PLAN (LABS / IMAGING / EKG):  Orders Placed This Encounter   Procedures    XR CERVICAL SPINE (2-3 VIEWS)       MEDICATIONS ORDERED:  Orders Placed This Encounter   Medications    ibuprofen (ADVIL;MOTRIN) tablet 800 mg    acetaminophen (TYLENOL) tablet 1,000 mg    acetaminophen (TYLENOL) 500 MG tablet     Sig: Take 2 tablets by mouth 3 times daily     Dispense:  90 tablet     Refill:  0    ibuprofen (ADVIL;MOTRIN) 600 MG tablet Sig: Take 1 tablet by mouth every 6 hours as needed for Pain     Dispense:  60 tablet     Refill:  0    cyclobenzaprine (FLEXERIL) 10 MG tablet     Sig: Take 1 tablet by mouth 3 times daily as needed for Muscle spasms     Dispense:  15 tablet     Refill:  0       DDX: disc herniation, compression fracture, MSK strain/sprain, thoracic outlet syndrome    Initial MDM/Plan: 27 y.o. male who presents with concern for paresthesias in pain down right arm. Abdominal examination patient is no acute distress, vital signs are stable, he is afebrile at this time. Physical exam as above. Will obtain x-ray of cervical spine to rule out any bony abnormalities, cervical rib that might be contributing. Plan for likely discharge with Tylenol, Motrin, flexeril PCP referral for physical therapy and further management if needed. DIAGNOSTIC RESULTS / EMERGENCY DEPARTMENT COURSE / MDM     LABS:  Labs Reviewed - No data to display      RADIOLOGY:  XR CERVICAL SPINE (2-3 VIEWS)    Result Date: 9/6/2022  EXAMINATION: 3 XRAY VIEWS OF THE CERVICAL SPINE 9/6/2022 7:44 pm COMPARISON: 02/13/2021 HISTORY: ORDERING SYSTEM PROVIDED HISTORY: neck pain with radiation down R arm TECHNOLOGIST PROVIDED HISTORY: neck pain with radiation down R arm FINDINGS: All 7 cervical vertebrae are visualized and appear normal in height and alignment. Multilevel mild degenerative disc disease, most significant at C6-C7. There is no evidence of prevertebral soft tissue edema or fracture. The base of the odontoid appears intact. Multilevel mild degenerative disc disease, most significant at C6-C7. EKG  All EKG's are interpreted by the Emergency Department Physician who either signs or Co-signs this chart in the absence of a cardiologist.    EMERGENCY DEPARTMENT COURSE:  ED Course as of 09/07/22 1755   Tue Sep 06, 2022   2027 Multilevel mild degenerative disc disease, most significant at C6-C7.   Patient was updated on results, plan for discharge with symptomatic control, muscle relaxants, PCP follow-up. Follow up plans and ER return precautions discussed. Patient verbalized understanding and had no further questions at time of discharge. [JS]      ED Course User Index  [JS] Candis Pat DO     PROCEDURES:  None    CONSULTS:  None    CRITICAL CARE:  Please see attending note    FINAL IMPRESSION      1. Cervical radiculopathy        DISPOSITION / PLAN     DISPOSITION Decision To Discharge 09/06/2022 08:48:02 PM    PATIENTREFERRED TO:  [facility]  [address] [place] [address]  [phone]  Go to   If symptoms worsen    Primary Care Physician  Please follow-up with your primary care physician in the next 3 to 5 days. If you do not haev a primary care physician you can follow-up with one of the family practices on the follow-up clinic list provided here for you.         DISCHARGE MEDICATIONS:  Discharge Medication List as of 9/6/2022  9:10 PM        START taking these medications    Details   cyclobenzaprine (FLEXERIL) 10 MG tablet Take 1 tablet by mouth 3 times daily as needed for Muscle spasms, Disp-15 tablet, R-0Print             Kristyn Esposito DO  EmergencyMedicine Resident    (Please note that portions of this note were completed with a voice recognition program.  Efforts were made to edit the dictations but occasionally words are mis-transcribed.)       Candis Pat DO  Resident  09/07/22 3912

## 2022-09-06 NOTE — ED PROVIDER NOTES
[address] [facility]     Emergency Department     Faculty Attestation    I performed a history and physical examination of the patient and discussed management with the resident. I reviewed the resident´s note and agree with the documented findings and plan of care. Any areas of disagreement are noted on the chart. I was personally present for the key portions of any procedures. I have documented in the chart those procedures where I was not present during the key portions. I have reviewed the emergency nurses triage note. I agree with the chief complaint, past medical history, past surgical history, allergies, medications, social and family history as documented unless otherwise noted below. For Physician Assistant/ Nurse Practitioner cases/documentation I have personally evaluated this patient and have completed at least one if not all key elements of the E/M (history, physical exam, and MDM). Additional findings are as noted. Numbness in the right thumb when patient turns his head to the right, good muscle strength in the right upper extremity. No signs of DVT.      Jeanna De La Fuente MD  09/06/22 2447

## 2022-09-07 ASSESSMENT — ENCOUNTER SYMPTOMS
VOMITING: 0
NAUSEA: 0
DIARRHEA: 0
SHORTNESS OF BREATH: 0
ABDOMINAL PAIN: 0
CONSTIPATION: 0
SORE THROAT: 0

## 2022-09-07 NOTE — DISCHARGE INSTRUCTIONS
Thank you for visiting [facility] Emergency Department. You were seen for concerns of neck pain and some numbness in her fingers. This is likely due to a muscle spasm in her neck. Had x-rays of your neck show some mild arthritis at this time. We are discharging you on some medications for Tylenol, ibuprofen as well as some Flexeril which is a muscle relaxant, this medication can make you drowsy, please do not take while driving or operating any machinery. We recommend taking this mainly at night. Using these medications as well as some gentle stretching consistently over the next few days, you should start to notice some improvement with your symptoms. You need to call your primary care physician to make an appointment for follow-up for reevaluation of your neck and arm pain in the next 5 to 7 days. Utilize return to emergency department for any severe worsening of symptoms, worsening numbness, weakness, paresthesias of the arm or worsening neck pain. Should you have any questions regarding your care or further treatment, please call [facility] Emergency Department at [phone]      PLEASE [address] [facility] IMMEDIATELY for worsening symptoms, or if you develop any concerning symptoms such as: high fever not relieved by tylenol and/or motrin, chills, shortness of breath, chest pain, persistent nausea and/or vomiting, numbness, weakness or tingling in the arms or legs or change in color of the extremities, changes in mental status, persistent headache, blurry vision, inability to urinate, unable to follow up with your physician, or other any other  Care or concern.

## 2023-02-15 ENCOUNTER — HOSPITAL ENCOUNTER (EMERGENCY)
Age: 33
Discharge: HOME | End: 2023-02-15

## 2023-08-28 ENCOUNTER — APPOINTMENT (OUTPATIENT)
Dept: CT IMAGING | Age: 33
End: 2023-08-28

## 2023-08-28 ENCOUNTER — HOSPITAL ENCOUNTER (EMERGENCY)
Age: 33
Discharge: HOME | End: 2023-08-28
Attending: EMERGENCY MEDICINE

## 2023-08-28 VITALS
OXYGEN SATURATION: 99 % | DIASTOLIC BLOOD PRESSURE: 97 MMHG | RESPIRATION RATE: 16 BRPM | HEART RATE: 71 BPM | TEMPERATURE: 99.4 F | BODY MASS INDEX: 23.71 KG/M2 | SYSTOLIC BLOOD PRESSURE: 140 MMHG | WEIGHT: 170 LBS

## 2023-08-28 DIAGNOSIS — J02.0 STREP PHARYNGITIS: Primary | ICD-10-CM

## 2023-08-28 LAB
MICROORGANISM SPEC CULT: NORMAL
S PYO AG THROAT QL: POSITIVE
SPECIMEN DESCRIPTION: NORMAL
SPECIMEN SOURCE: ABNORMAL

## 2023-08-28 PROCEDURE — 99285 EMERGENCY DEPT VISIT HI MDM: CPT | Performed by: EMERGENCY MEDICINE

## 2023-08-28 PROCEDURE — 6360000002 HC RX W HCPCS: Performed by: EMERGENCY MEDICINE

## 2023-08-28 PROCEDURE — 70491 CT SOFT TISSUE NECK W/DYE: CPT

## 2023-08-28 PROCEDURE — 96372 THER/PROPH/DIAG INJ SC/IM: CPT | Performed by: EMERGENCY MEDICINE

## 2023-08-28 PROCEDURE — 6360000004 HC RX CONTRAST MEDICATION: Performed by: EMERGENCY MEDICINE

## 2023-08-28 PROCEDURE — 87880 STREP A ASSAY W/OPTIC: CPT

## 2023-08-28 RX ORDER — DEXAMETHASONE SODIUM PHOSPHATE 10 MG/ML
10 INJECTION, SOLUTION INTRAMUSCULAR; INTRAVENOUS ONCE
Status: COMPLETED | OUTPATIENT
Start: 2023-08-28 | End: 2023-08-28

## 2023-08-28 RX ADMIN — DEXAMETHASONE SODIUM PHOSPHATE 10 MG: 10 INJECTION, SOLUTION INTRAMUSCULAR; INTRAVENOUS at 12:49

## 2023-08-28 RX ADMIN — IOPAMIDOL 75 ML: 755 INJECTION, SOLUTION INTRAVENOUS at 15:04

## 2023-08-28 RX ADMIN — PENICILLIN G BENZATHINE 1.2 MILLION UNITS: 1200000 INJECTION, SUSPENSION INTRAMUSCULAR at 16:23

## 2023-08-28 ASSESSMENT — PAIN DESCRIPTION - LOCATION: LOCATION: THROAT

## 2023-08-28 ASSESSMENT — PAIN DESCRIPTION - DESCRIPTORS: DESCRIPTORS: ACHING;DISCOMFORT;SORE

## 2023-08-28 ASSESSMENT — PAIN SCALES - GENERAL: PAINLEVEL_OUTOF10: 9

## 2023-08-28 NOTE — ED PROVIDER NOTES
[address] ED  Emergency Department Encounter  Emergency Medicine Resident     Pt Name:Alfonzo Chand  MRN: [id number]  [address] [facility] 1990  Date of evaluation: 8/28/23  PCP:  No primary care provider on file. Note Started: 12:27 PM EDT      CHIEF COMPLAINT       Chief Complaint   Patient presents with    Oral Swelling       HISTORY OF PRESENT ILLNESS  (Location/Symptom, Timing/Onset, Context/Setting, Quality, Duration, Modifying Factors, Severity.)      Nighat Gabriel is a 34 y.o. male who presents with pharyngitis ongoing since last Saturday, over a week ago. Patient states he has had progressively worsening shortness of breath in that timeframe. States he feels as though his throat is swollen. Also feels he is having some submandibular swelling. States he has been having difficulty swallowing as well as pain with swallowing. Denies any difficulty breathing. Patient has not been taking anything at home for pain. Denies any fevers or chills. Denies any known sick contacts. Patient also states he is having a hard time opening his mouth all the way. Patient states he does have a history of a jaw fracture, however the difficulty opening his mouth has changed over the last week with the increasing pain. Patient states he does have known broken teeth, has not had any recent dental procedures done. PAST MEDICAL / SURGICAL / SOCIAL / FAMILY HISTORY      has a past medical history of Murmur, heart.       has a past surgical history that includes Mandible surgery (02/13/2021); Mandible fracture surgery (N/A, 2/13/2021); Cardiac surgery (1989); Mandible fracture surgery (N/A, 04/07/2021); and Mouth surgery (N/A, 4/7/2021).       Social History     Socioeconomic History    Marital status: Single     Spouse name: Not on file    Number of children: Not on file    Years of education: Not on file    Highest education level: Not on file   Occupational History    Not on file   Tobacco Use

## 2023-08-28 NOTE — DISCHARGE INSTRUCTIONS
You are seen in the ER today for your sore throat. We did a strep swab which was positive. We gave you a muscle shot of an antibiotic which should help. Additionally, we gave you a dose of a steroid called Decadron which should start to help with your sore throat. Also, we did a CT scan to look for any abscess that requires drainage, this was negative, it is showing swelling of the tonsils, however does not show any focal abscess that needs to be drained. At this time, you are stable to be discharged. Please continue to monitor for any difficulty swallowing, difficulty breathing, fevers that are not coming down with Tylenol Motrin, or any other concerning symptoms. If any of these should arise, please return to the emergency department. Otherwise, please follow-up with a primary care provider from the clinic listed on your discharge paperwork to establish care with a primary doctor. PLEASE RETURN TO THE EMERGENCY DEPARTMENT IMMEDIATELY if you develop any concerning symptoms such as: chest pain, shortness of breath, feeling like your heart is racing, high fever not relieved by acetaminophen (Tylenol) and/or ibuprofen (Motrin / Advil), chills, persistent nausea and/or vomiting, loss of consciousness, numbness, weakness or tingling in the arms or legs or change in color of the extremities, changes in mental status, persistent or severe headache, blurry vision, loss of bladder / bowel control, unable to follow up with your physician, or other any other care or concern.

## 2023-08-29 ASSESSMENT — ENCOUNTER SYMPTOMS
TROUBLE SWALLOWING: 1
SORE THROAT: 1
ABDOMINAL PAIN: 0
VOMITING: 0
COUGH: 0
SHORTNESS OF BREATH: 0
VOICE CHANGE: 1
NAUSEA: 0

## 2023-11-22 ENCOUNTER — HOSPITAL ENCOUNTER (EMERGENCY)
Age: 33
Discharge: HOME | End: 2023-11-22
Attending: EMERGENCY MEDICINE
Payer: COMMERCIAL

## 2023-11-22 VITALS
DIASTOLIC BLOOD PRESSURE: 101 MMHG | HEART RATE: 56 BPM | RESPIRATION RATE: 14 BRPM | OXYGEN SATURATION: 100 % | SYSTOLIC BLOOD PRESSURE: 141 MMHG | TEMPERATURE: 97.7 F

## 2023-11-22 DIAGNOSIS — K08.89 DENTALGIA: ICD-10-CM

## 2023-11-22 DIAGNOSIS — K04.7 DENTAL INFECTION: Primary | ICD-10-CM

## 2023-11-22 PROCEDURE — 99283 EMERGENCY DEPT VISIT LOW MDM: CPT

## 2023-11-22 RX ORDER — PENICILLIN V POTASSIUM 500 MG/1
500 TABLET ORAL 4 TIMES DAILY
Qty: 40 TABLET | Refills: 0 | Status: SHIPPED | OUTPATIENT
Start: 2023-11-22

## 2023-11-22 RX ORDER — NAPROXEN 500 MG/1
500 TABLET ORAL 2 TIMES DAILY WITH MEALS
Qty: 20 TABLET | Refills: 0 | Status: SHIPPED | OUTPATIENT
Start: 2023-11-22

## 2023-11-22 RX ORDER — HYDROCODONE BITARTRATE AND ACETAMINOPHEN 5; 325 MG/1; MG/1
1-2 TABLET ORAL EVERY 8 HOURS PRN
Qty: 12 TABLET | Refills: 0 | Status: SHIPPED | OUTPATIENT
Start: 2023-11-22 | End: 2023-11-25

## 2023-11-22 NOTE — ED PROVIDER NOTES
[facility]  [facility]      Pt Name: Twan Diaz  MRN: [id number]  [address] [facility] 1990  Date ofevaluation: 11/22/2023  Provider: Osborne Kocher, PA-C    CHIEF COMPLAINT       Chief Complaint   Patient presents with    Dental Pain     Right lower dental pain for three days         HISTORY OF PRESENT ILLNESS  (Location/Symptom, Timing/Onset, Context/Setting, Quality, Duration, Modifying Factors, Severity.)   Twan Diaz is a 28 y.o. male who presents to the emergency department with right lower dental pain for the last 3 days. Patient denies any fevers or chills. No Nausea vomiting. Pain worse eating and drinking. Nursing Notes were reviewed. ALLERGIES     Patient has no known allergies. CURRENT MEDICATIONS       Discharge Medication List as of 11/22/2023  9:57 AM        CONTINUE these medications which have NOT CHANGED    Details   acetaminophen (TYLENOL) 500 MG tablet Take 2 tablets by mouth 3 times daily, Disp-90 tablet, R-0Print      ibuprofen (ADVIL;MOTRIN) 600 MG tablet Take 1 tablet by mouth every 6 hours as needed for Pain, Disp-60 tablet, R-0Print      Magic Mouthwash (MIRACLE MOUTHWASH) Swish and spit 5 mLs 4 times daily as needed for Irritation, Disp-240 mL, R-0Print             PAST MEDICAL HISTORY         Diagnosis Date    Murmur, heart        SURGICAL HISTORY           Procedure Laterality Date    CARDIAC SURGERY  1989    heart murmur as a child    MANDIBLE FRACTURE SURGERY N/A 2/13/2021    ARCH BAR FIXATION TO MANDIBLE FRACTURE performed by Bee Ariza MD at [facility] N/A 04/07/2021    ARCH BAR REMOVAL (N/A )    MANDIBLE SURGERY  [id number]/6696    ARCH BAR APPLICATION    MOUTH SURGERY N/A 4/7/2021    ARCH BAR REMOVAL performed by Bee Ariza MD at [address]     History reviewed. No pertinent family history.   Family Status   Relation Name Status    Mother  Alive    Father  Alive

## 2023-11-22 NOTE — ED PROVIDER NOTES
eMERGENCY dEPARTMENT eNCOUnter   [address] Name: James Elizabeth  MRN: [id number]  [address] [facility] 1990  Date of evaluation: 11/22/23     James Elizabeth is a 28 y.o. male with CC: Dental Pain (Right lower dental pain for three days)        This visit was performed by both a physician and an APC. I performed all aspects of the MDM as documented.       Jose Carlos Simms MD  Attending Emergency Physician            Jose Carlos Simms MD  09/08/54 8936

## 2024-03-18 PROCEDURE — 99283 EMERGENCY DEPT VISIT LOW MDM: CPT

## 2024-03-19 ENCOUNTER — HOSPITAL ENCOUNTER (EMERGENCY)
Age: 34
Discharge: HOME | End: 2024-03-19
Attending: EMERGENCY MEDICINE
Payer: COMMERCIAL

## 2024-03-19 VITALS
HEART RATE: 78 BPM | SYSTOLIC BLOOD PRESSURE: 136 MMHG | DIASTOLIC BLOOD PRESSURE: 86 MMHG | RESPIRATION RATE: 18 BRPM | OXYGEN SATURATION: 100 % | TEMPERATURE: 97.5 F

## 2024-03-19 DIAGNOSIS — H20.9 TRAUMATIC IRITIS: Primary | ICD-10-CM

## 2024-03-19 PROCEDURE — 6370000000 HC RX 637 (ALT 250 FOR IP): Performed by: EMERGENCY MEDICINE

## 2024-03-19 RX ORDER — ERYTHROMYCIN 5 MG/G
OINTMENT OPHTHALMIC
Qty: 1 G | Refills: 0 | Status: SHIPPED | OUTPATIENT
Start: 2024-03-19 | End: 2024-03-29

## 2024-03-19 RX ORDER — PREDNISOLONE ACETATE 10 MG/ML
1 SUSPENSION/ DROPS OPHTHALMIC 4 TIMES DAILY
Qty: 5 ML | Refills: 0 | Status: SHIPPED | OUTPATIENT
Start: 2024-03-19 | End: 2024-03-26

## 2024-03-19 RX ORDER — IBUPROFEN 400 MG/1
400 TABLET ORAL ONCE
Status: COMPLETED | OUTPATIENT
Start: 2024-03-19 | End: 2024-03-19

## 2024-03-19 RX ORDER — TETRACAINE HYDROCHLORIDE 5 MG/ML
1 SOLUTION OPHTHALMIC ONCE
Status: COMPLETED | OUTPATIENT
Start: 2024-03-19 | End: 2024-03-19

## 2024-03-19 RX ORDER — CYCLOPENTOLATE HYDROCHLORIDE 10 MG/ML
1 SOLUTION/ DROPS OPHTHALMIC ONCE
Status: DISCONTINUED | OUTPATIENT
Start: 2024-03-19 | End: 2024-03-19

## 2024-03-19 RX ORDER — CYCLOPENTOLATE HYDROCHLORIDE 5 MG/ML
1 SOLUTION/ DROPS OPHTHALMIC ONCE
Status: COMPLETED | OUTPATIENT
Start: 2024-03-19 | End: 2024-03-19

## 2024-03-19 RX ADMIN — FLUORESCEIN SODIUM 1 MG: 1 STRIP OPHTHALMIC at 01:21

## 2024-03-19 RX ADMIN — CYCLOPENTOLATE HYDROCHLORIDE 1 DROP: 5 SOLUTION/ DROPS OPHTHALMIC at 02:23

## 2024-03-19 RX ADMIN — TETRACAINE HYDROCHLORIDE 1 DROP: 5 SOLUTION OPHTHALMIC at 01:21

## 2024-03-19 RX ADMIN — IBUPROFEN 400 MG: 400 TABLET, FILM COATED ORAL at 00:55

## 2024-03-19 ASSESSMENT — ENCOUNTER SYMPTOMS
EYE DISCHARGE: 1
EYE REDNESS: 1
EYE PAIN: 1
PHOTOPHOBIA: 1

## 2024-03-19 ASSESSMENT — VISUAL ACUITY
OU: 20/20
OS: 20/20
OD: 20/25
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
## 2024-03-19 NOTE — ED PROVIDER NOTES
Faculty Sign-Out Attestation  Handoff taken on the following patient from prior Attending Physician: Maikol  Note Started: 1:58 AM EDT    I was available and discussed any additional care issues that arose and coordinated the management plans with the resident(s) caring for the patient during my duty period. Any areas of disagreement with resident’s documentation of care or procedures are noted on the chart. I was personally present for the key portions of any/all procedures during my duty period. I have documented in the chart those procedures where I was not present during the key portions.    Traumatic iritis, optho return call,   discharge    Nabil Vital DO  Attending Physician       Nabil Vital DO  03/19/24 0158
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
## 2024-03-19 NOTE — ED PROVIDER NOTES
[facility] ED  Emergency Department Encounter  Emergency Medicine Resident     Pt Name:Alfonzo Bo  MRN: [id number]  Birthdate 1990  Date of evaluation: 3/19/24  PCP:  No primary care provider on file.  Note Started: 12:45 AM EDT      CHIEF COMPLAINT       Chief Complaint   Patient presents with    Eye Pain     States nephew scratched him in the R eye yesterday       HISTORY OF PRESENT ILLNESS  (Location/Symptom, Timing/Onset, Context/Setting, Quality, Duration, Modifying Factors, Severity.)      Alfonzo Bo is a 33 y.o. male who presents with right eye pain.  Patient states that he was playing with his nephew yesterday he scratched him in the eye.  He states that he had a little bit of pain at that time but pain worsened throughout the day.  He is also complaining of photophobia and watering of the eye.  No headache or pain behind his eye.  He does not wear contacts but has a prescription for glasses that he has not worn in 2 years.  Unsure what his prescription is.  States he is otherwise healthy does not take medications for anything every day    PAST MEDICAL / SURGICAL / SOCIAL / FAMILY HISTORY      has a past medical history of Murmur, heart.       has a past surgical history that includes Mandible surgery (02/13/2021); Mandible fracture surgery (N/A, 2/13/2021); Cardiac surgery (1989); Mandible fracture surgery (N/A, 04/07/2021); and Mouth surgery (N/A, 4/7/2021).      Social History     Socioeconomic History    Marital status: Single     Spouse name: Not on file    Number of children: Not on file    Years of education: Not on file    Highest education level: Not on file   Occupational History    Not on file   Tobacco Use    Smoking status: Never    Smokeless tobacco: Never   Vaping Use    Vaping Use: Not on file   Substance and Sexual Activity    Alcohol use: Yes     Comment: social    Drug use: Yes     Types: Marijuana (Weed)     Comment: 3 mos ago    Sexual activity: Yes    Other Topics Concern    Not on file   Social History Narrative    Not on file     Social Determinants of Health     Financial Resource Strain: Not on file   Food Insecurity: Not on file   Transportation Needs: Not on file   Physical Activity: Not on file   Stress: Not on file   Social Connections: Not on file   Intimate Partner Violence: Not on file   Housing Stability: Not on file       History reviewed. No pertinent family history.    Allergies:  Patient has no known allergies.    Home Medications:  Prior to Admission medications    Medication Sig Start Date End Date Taking? Authorizing Provider   prednisoLONE acetate (PRED FORTE) 1 % ophthalmic suspension Place 1 drop into the right eye 4 times daily for 7 days 3/19/24 3/26/24 Yes Mary Rachel DO   erythromycin (ROMYCIN) 5 MG/GM ophthalmic ointment Please apply 1 strip of ointment to right eye 4 times daily for 7 days 3/19/24 3/29/24 Yes Mary Rachel DO   naproxen (NAPROSYN) 500 MG tablet Take 1 tablet by mouth 2 times daily (with meals) 11/22/23   Harish Mccullough PA-C   penicillin v potassium (VEETID) 500 MG tablet Take 1 tablet by mouth 4 times daily 11/22/23   Harish Mccullough PA-C   acetaminophen (TYLENOL) 500 MG tablet Take 2 tablets by mouth 3 times daily 9/6/22   Mikel Gifford DO   ibuprofen (ADVIL;MOTRIN) 600 MG tablet Take 1 tablet by mouth every 6 hours as needed for Pain 9/6/22   Mikel Gifford,    Magic Mouthwash (MIRACLE MOUTHWASH) Swish and spit 5 mLs 4 times daily as needed for Irritation 2/21/21   Mikel Bowie MD         REVIEW OF SYSTEMS       Review of Systems   Eyes:  Positive for photophobia, pain, discharge and redness.   Neurological:  Negative for headaches.       PHYSICAL EXAM      INITIAL VITALS:   /86   Pulse 78   Temp 97.5 °F (36.4 °C) (Oral)   Resp 18   SpO2 100%     Physical Exam  Constitutional:       General: He is not in acute distress.  HENT:      Head: Normocephalic and

## (undated) DEVICE — GARMENT,MEDLINE,DVT,INT,CALF,MED, GEN2: Brand: MEDLINE

## (undated) DEVICE — GLOVE SURG SZ 6 THK91MIL LTX FREE SYN POLYISOPRENE ANTI

## (undated) DEVICE — BRUSH TOOTH SURG SCRB FOR CANN CLN

## (undated) DEVICE — GLOVE SURG SZ 65 L12IN THK75MIL DK GRN LTX FREE

## (undated) DEVICE — TUBING, SUCTION, 9/32" X 20', STRAIGHT: Brand: MEDLINE INDUSTRIES, INC.

## (undated) DEVICE — YANKAUER,FLEXIBLE HANDLE,REGLR CAPACITY: Brand: MEDLINE INDUSTRIES, INC.

## (undated) DEVICE — MHPB HEAD AND NECK  PACK: Brand: MEDLINE INDUSTRIES, INC.

## (undated) DEVICE — TOWEL,OR,DSP,ST,NATURAL,DLX,4/PK,20PK/CS: Brand: MEDLINE

## (undated) DEVICE — SOLUTION PREP POVIDONE IOD FOR SKIN MUCOUS MEM PRIOR TO

## (undated) DEVICE — GLOVE ORANGE PI 7 1/2   MSG9075

## (undated) DEVICE — Z DISCONTINUED BY MEDLINE USE 2711682 TRAY SKIN PREP DRY W/ PREM GLV

## (undated) DEVICE — Device

## (undated) DEVICE — GLOVE SURG SZ 65 THK91MIL LTX FREE SYN POLYISOPRENE

## (undated) DEVICE — SUTURE STL SZ 0 L18IN NONABSORBABLE UD TIE PRE-CUT MFIL DS26

## (undated) DEVICE — TOOTHBRUSH ADLT

## (undated) DEVICE — ELECTRODE PT RET AD L9FT HI MOIST COND ADH HYDRGEL CORDED

## (undated) DEVICE — SOLUTION IV IRRIG POUR BRL 0.9% SODIUM CHL 2F7124